# Patient Record
Sex: FEMALE | Employment: FULL TIME | URBAN - METROPOLITAN AREA
[De-identification: names, ages, dates, MRNs, and addresses within clinical notes are randomized per-mention and may not be internally consistent; named-entity substitution may affect disease eponyms.]

---

## 2021-08-27 ENCOUNTER — TELEPHONE (OUTPATIENT)
Dept: SCHEDULING | Facility: IMAGING CENTER | Age: 36
End: 2021-08-27

## 2021-08-30 ENCOUNTER — OFFICE VISIT (OUTPATIENT)
Dept: URGENT CARE | Facility: PHYSICIAN GROUP | Age: 36
End: 2021-08-30
Payer: COMMERCIAL

## 2021-08-30 VITALS
TEMPERATURE: 98 F | OXYGEN SATURATION: 100 % | DIASTOLIC BLOOD PRESSURE: 84 MMHG | HEART RATE: 105 BPM | SYSTOLIC BLOOD PRESSURE: 134 MMHG | RESPIRATION RATE: 14 BRPM | HEIGHT: 60 IN | BODY MASS INDEX: 41.23 KG/M2 | WEIGHT: 210 LBS

## 2021-08-30 DIAGNOSIS — U07.1 COVID-19 VIRUS INFECTION: ICD-10-CM

## 2021-08-30 PROCEDURE — 99202 OFFICE O/P NEW SF 15 MIN: CPT | Mod: CS | Performed by: PHYSICIAN ASSISTANT

## 2021-08-30 RX ORDER — HYDROXYZINE HYDROCHLORIDE 10 MG/1
10 TABLET, FILM COATED ORAL 2 TIMES DAILY PRN
COMMUNITY
Start: 2021-07-01 | End: 2022-12-03

## 2021-08-30 RX ORDER — BUPROPION HYDROCHLORIDE 100 MG/1
100 TABLET, EXTENDED RELEASE ORAL
COMMUNITY
Start: 2021-07-28 | End: 2022-12-03

## 2021-08-30 RX ORDER — GABAPENTIN 100 MG/1
100 CAPSULE ORAL 3 TIMES DAILY
COMMUNITY
Start: 2021-07-28

## 2021-08-30 NOTE — LETTER
August 30, 2021         Patient: Sabi Martinez   YOB: 1985   Date of Visit: 8/30/2021           To Whom it May Concern:    Sabi Martinez was seen in my clinic on 8/30/2021. She may return to work 8/31/21 without restriction.    If you have any questions or concerns, please don't hesitate to call.        Sincerely,           Vanda Young P.A.-C.  Electronically Signed

## 2021-08-30 NOTE — PROGRESS NOTES
Chief Complaint   Patient presents with   • Coronavirus Screening     pt states she just needs clearence to go back to work, tested pos on the 15th for covid       HISTORY OF PRESENT ILLNESS: Patient is a 36 y.o. female who presents today for the following:    COVID +  Symptoms started 8/15  Did a home test showing positive  Has not had any fever, body aches, chills in over a week  Overall feeling better  Needs a note to return to work    There are no problems to display for this patient.      Allergies:Patient has no known allergies.    Current Outpatient Medications Ordered in Epic   Medication Sig Dispense Refill   • hydrOXYzine HCl (ATARAX) 10 MG Tab Take 10 mg by mouth 2 times a day as needed.     • gabapentin (NEURONTIN) 100 MG Cap Take 100 mg by mouth 3 times a day.     • buPROPion SR (WELLBUTRIN-SR) 100 MG TABLET SR 12 HR Take 100 mg by mouth.       No current Epic-ordered facility-administered medications on file.       History reviewed. No pertinent past medical history.    Social History     Tobacco Use   • Smoking status: Current Every Day Smoker     Types: Cigarettes   • Smokeless tobacco: Never Used   Substance Use Topics   • Alcohol use: Never   • Drug use: Never       No family status information on file.   History reviewed. No pertinent family history.    Review of Systems:   Constitutional ROS: No unexpected change in weight  Pulmonary ROS: No chronic cough, sputum, or hemoptysis, No dyspnea on exertion, No wheezing  Cardiovascular ROS: No diaphoresis, No edema, No palpitations  Hematologic/Lymphatic ROS: No chills, No night sweats, No weight loss  Skin/Integumentary ROS: No edema, No evidence of rash, No itching    Exam:  /84   Pulse (!) 105   Temp 36.7 °C (98 °F)   Resp 14   Ht 1.524 m (5')   Wt 95.3 kg (210 lb)   SpO2 100%   General: Well developed, well nourished. No distress.    HEENT: Head is grossly normal.  Pulmonary: Unlabored respiratory effort. Lungs clear to auscultation, no  wheezes, no rhonchi.    Cardiovascular: Regular rate and rhythm without murmur.   Neurologic: Grossly nonfocal. No facial asymmetry noted.  Skin: Warm, dry, good turgor. No rashes in visible areas.   Psych: Normal mood. Alert and oriented to person, place and time.    Assessment/Plan:  RTC as needed.  1. COVID-19 virus infection      Feeling better. Finished quarantine.

## 2021-09-09 ENCOUNTER — OFFICE VISIT (OUTPATIENT)
Dept: MEDICAL GROUP | Facility: PHYSICIAN GROUP | Age: 36
End: 2021-09-09
Payer: COMMERCIAL

## 2021-09-09 VITALS
HEIGHT: 61 IN | BODY MASS INDEX: 39.16 KG/M2 | SYSTOLIC BLOOD PRESSURE: 110 MMHG | HEART RATE: 103 BPM | DIASTOLIC BLOOD PRESSURE: 74 MMHG | TEMPERATURE: 97.8 F | WEIGHT: 207.4 LBS | OXYGEN SATURATION: 95 % | RESPIRATION RATE: 16 BRPM

## 2021-09-09 DIAGNOSIS — E66.09 CLASS 2 OBESITY DUE TO EXCESS CALORIES WITHOUT SERIOUS COMORBIDITY WITH BODY MASS INDEX (BMI) OF 39.0 TO 39.9 IN ADULT: ICD-10-CM

## 2021-09-09 DIAGNOSIS — F32.1 CURRENT MODERATE EPISODE OF MAJOR DEPRESSIVE DISORDER WITHOUT PRIOR EPISODE (HCC): ICD-10-CM

## 2021-09-09 DIAGNOSIS — U09.9 POST-COVID SYNDROME: ICD-10-CM

## 2021-09-09 DIAGNOSIS — Z76.89 ENCOUNTER TO ESTABLISH CARE: ICD-10-CM

## 2021-09-09 PROCEDURE — 99214 OFFICE O/P EST MOD 30 MIN: CPT | Performed by: NURSE PRACTITIONER

## 2021-09-09 RX ORDER — MULTIVIT WITH MINERALS/LUTEIN
TABLET ORAL
COMMUNITY
End: 2022-12-03

## 2021-09-09 ASSESSMENT — PATIENT HEALTH QUESTIONNAIRE - PHQ9
CLINICAL INTERPRETATION OF PHQ2 SCORE: 1
5. POOR APPETITE OR OVEREATING: 2 - MORE THAN HALF THE DAYS
SUM OF ALL RESPONSES TO PHQ QUESTIONS 1-9: 11
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
## 2021-09-09 NOTE — LETTER
[address] [id number]     September 9, 2021    Patient: Sabi Martinez   YOB: 1985   Date of Visit: 9/9/2021       To Whom It May Concern:    Sabi Martinez was seen and treated in our department on 9/9/2021.  Patient has picture taken on August 20, 2021 for positive Abbott at home Covid test.  Due to CDC guidelines she was in quarantine for 10 days.  She was seen by me on urgent care on August 31 and was given a return to work letter.      Sincerely,         LIZZY Negrete.
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
## 2021-09-10 NOTE — ASSESSMENT & PLAN NOTE
Patient reports that she did an at home test and tested positive for Covid on August 17, 2021 with a positive at home Abbot test on August 20, 2021. She was cleared to return to work on August 31, 2021 by urgent care provider. Today she is requesting letter from a provider stating that she had a positive at home Covid test to presented to work in hopes of getting short-term leave.     Patient does continue to report chronic fatigue, brain fog, and occasional mouth ulcers.    Discussed with patient that because I did not see her on the test was not performed in clinic I was unable to verify that the test she presented in a photograph taken on August 20, 2019 was hers however I would write a letter stating that she did not at home test that she reports was positive and her sample. Per CDC guidelines she was to quarantine at home for 10 days. She was seen in urgent care for return to work letter on August 31, 2021.  Letter was provided to patient at time of discharge.

## 2022-06-08 ENCOUNTER — OFFICE VISIT (OUTPATIENT)
Dept: URGENT CARE | Facility: PHYSICIAN GROUP | Age: 37
End: 2022-06-08
Payer: COMMERCIAL

## 2022-06-08 VITALS
WEIGHT: 221 LBS | HEIGHT: 61 IN | TEMPERATURE: 97.6 F | RESPIRATION RATE: 16 BRPM | BODY MASS INDEX: 41.72 KG/M2 | SYSTOLIC BLOOD PRESSURE: 126 MMHG | HEART RATE: 110 BPM | DIASTOLIC BLOOD PRESSURE: 68 MMHG | OXYGEN SATURATION: 97 %

## 2022-06-08 DIAGNOSIS — U07.1 COVID-19 VIRUS INFECTION: Primary | ICD-10-CM

## 2022-06-08 DIAGNOSIS — R05.9 COUGH: ICD-10-CM

## 2022-06-08 PROCEDURE — 99213 OFFICE O/P EST LOW 20 MIN: CPT | Mod: CS | Performed by: PHYSICIAN ASSISTANT

## 2022-06-08 RX ORDER — DEXTROMETHORPHAN HYDROBROMIDE AND PROMETHAZINE HYDROCHLORIDE 15; 6.25 MG/5ML; MG/5ML
5 SYRUP ORAL EVERY 4 HOURS PRN
Qty: 180 ML | Refills: 0 | Status: SHIPPED | OUTPATIENT
Start: 2022-06-08 | End: 2022-06-18

## 2022-06-08 ASSESSMENT — ENCOUNTER SYMPTOMS
FEVER: 1
MYALGIAS: 1
HEADACHES: 1
SPUTUM PRODUCTION: 1
SHORTNESS OF BREATH: 0
WHEEZING: 0
RHINORRHEA: 1
SINUS PAIN: 0
SORE THROAT: 0
COUGH: 1

## 2022-06-08 NOTE — PROGRESS NOTES
"Subjective     Sabi Martinez is a 37 y.o. female who presents with Coronavirus Screening (Positive at home test, all symptoms began Monday night, body aches, fatigue, congestion, cough, fever. )    Medications:    • buPROPion SR Tb12  • gabapentin Caps  • hydrOXYzine HCl Tabs  • promethazine-dextromethorphan  • Vitamin C Tabs    Allergies: Patient has no known allergies.    Problem List: Sabi Martinez does not have any pertinent problems on file.    Surgical History:  Past Surgical History:   Procedure Laterality Date   • ANKLE ARTHROSCOPY Right    • DENTAL EXTRACTION(S)     • KNEE ARTHROSCOPY Right    • PRIMARY C SECTION         Past Social Hx: Sabi Martinez  reports that she has been smoking cigarettes. She has a 1.69 pack-year smoking history. She has never used smokeless tobacco. She reports that she does not drink alcohol and does not use drugs.     Past Family Hx:  Sabi Martinez family history includes ADD / ADHD in her mother; Alcohol abuse in her father and mother; Anxiety disorder in her mother; Bipolar disorder in her mother; Cancer in her father, maternal aunt, and paternal grandfather; Depression in her mother; Drug abuse in her father and mother; No Known Problems in her maternal grandfather, maternal grandmother, and paternal grandmother.     Problem list, medications, and allergies reviewed by myself today in Epic.          Patient presents with:  Coronavirus Screening: Positive at home test, all symptoms began Monday night, body aches, fatigue, congestion, cough, fever.  Pt states she originally needed an \"official\" covid test but no longer needs one because she is also on bereavement leave until 6/16/2022 which would be outside of her quarantine requirements for work.  But she still needs a note to return to work on the 16th.          URI   This is a new problem. The current episode started in the past 7 days. The problem has been gradually worsening. The maximum temperature recorded prior to " "her arrival was 100.4 - 100.9 F. Associated symptoms include congestion, coughing, headaches and rhinorrhea. Pertinent negatives include no sinus pain, sore throat or wheezing. She has tried increased fluids, NSAIDs and acetaminophen for the symptoms. The treatment provided mild relief.       Review of Systems   Constitutional: Positive for fever and malaise/fatigue.   HENT: Positive for congestion and rhinorrhea. Negative for sinus pain and sore throat.    Respiratory: Positive for cough and sputum production. Negative for shortness of breath and wheezing.    Musculoskeletal: Positive for myalgias.   Neurological: Positive for headaches.   All other systems reviewed and are negative.             Objective     /68   Pulse (!) 110   Temp 36.4 °C (97.6 °F) (Temporal)   Resp 16   Ht 1.549 m (5' 1\")   Wt 100 kg (221 lb)   SpO2 97%   BMI 41.76 kg/m²      Physical Exam  Vitals and nursing note reviewed.   Constitutional:       General: She is not in acute distress.     Appearance: Normal appearance. She is well-developed and normal weight. She is not ill-appearing or toxic-appearing.   HENT:      Head: Normocephalic and atraumatic.      Right Ear: Tympanic membrane normal.      Left Ear: Tympanic membrane normal.      Nose: Mucosal edema, congestion and rhinorrhea present.      Mouth/Throat:      Mouth: Mucous membranes are moist.      Pharynx: Uvula midline. No posterior oropharyngeal erythema.   Eyes:      Extraocular Movements: Extraocular movements intact.      Conjunctiva/sclera: Conjunctivae normal.      Pupils: Pupils are equal, round, and reactive to light.   Cardiovascular:      Rate and Rhythm: Regular rhythm. Tachycardia present.      Pulses: Normal pulses.      Heart sounds: Normal heart sounds.   Pulmonary:      Effort: Pulmonary effort is normal. No respiratory distress.      Breath sounds: Normal breath sounds. No stridor. No wheezing, rhonchi or rales.   Chest:      Chest wall: No tenderness. "   Abdominal:      Palpations: Abdomen is soft.   Musculoskeletal:         General: Normal range of motion.      Cervical back: Normal range of motion and neck supple.   Skin:     General: Skin is warm and dry.      Capillary Refill: Capillary refill takes less than 2 seconds.   Neurological:      General: No focal deficit present.      Mental Status: She is alert and oriented to person, place, and time.      Gait: Gait normal.   Psychiatric:         Mood and Affect: Mood normal.         Behavior: Behavior is cooperative.                             Assessment & Plan        1. COVID-19 virus infection     - promethazine-dextromethorphan (PROMETHAZINE-DM) 6.25-15 MG/5ML syrup; Take 5 mL by mouth every four hours as needed for Cough for up to 10 days.  Dispense: 180 mL; Refill: 0    2. Cough     - promethazine-dextromethorphan (PROMETHAZINE-DM) 6.25-15 MG/5ML syrup; Take 5 mL by mouth every four hours as needed for Cough for up to 10 days.  Dispense: 180 mL; Refill: 0            Patient was evaluated in clinic today while wearing appropriate personal protective equipment.      PT to begin prescription medications today as discussed.     PT should follow up with PCP in 1-2 days for re-evaluation if symptoms have not improved.      Discussed red flags and reasons to return to UC or ED.      Pt and/or family verbalized understanding of diagnosis and follow up instructions and was offered informational handout on diagnosis.  PT discharged.

## 2022-06-08 NOTE — LETTER
June 8, 2022         Patient: Sabi Martinez   YOB: 1985   Date of Visit: 6/8/2022           To Whom it May Concern:    Sabi Martinez was seen in my clinic on 6/8/2022. She may return to work on 06/16/2022. She will be well out of the covid quarantine requirements by 6/16/2022.  She may return without restrictions.     If you have any questions or concerns, please don't hesitate to call.        Sincerely,           Sabrina Camacho P.A.-C.  Electronically Signed

## 2022-11-28 ENCOUNTER — OFFICE VISIT (OUTPATIENT)
Dept: URGENT CARE | Facility: PHYSICIAN GROUP | Age: 37
End: 2022-11-28
Payer: COMMERCIAL

## 2022-11-28 VITALS
SYSTOLIC BLOOD PRESSURE: 126 MMHG | RESPIRATION RATE: 16 BRPM | HEIGHT: 61 IN | HEART RATE: 89 BPM | OXYGEN SATURATION: 97 % | TEMPERATURE: 97.7 F | DIASTOLIC BLOOD PRESSURE: 58 MMHG | WEIGHT: 226 LBS | BODY MASS INDEX: 42.67 KG/M2

## 2022-11-28 DIAGNOSIS — R68.89 FLU-LIKE SYMPTOMS: ICD-10-CM

## 2022-11-28 LAB
FLUAV+FLUBV AG SPEC QL IA: NORMAL
INT CON NEG: NORMAL
INT CON POS: NORMAL

## 2022-11-28 PROCEDURE — 99213 OFFICE O/P EST LOW 20 MIN: CPT | Performed by: NURSE PRACTITIONER

## 2022-11-28 PROCEDURE — 87804 INFLUENZA ASSAY W/OPTIC: CPT | Performed by: NURSE PRACTITIONER

## 2022-11-28 ASSESSMENT — ENCOUNTER SYMPTOMS
COUGH: 1
CONSTITUTIONAL NEGATIVE: 1
FEVER: 0
GASTROINTESTINAL NEGATIVE: 1
CARDIOVASCULAR NEGATIVE: 1
EYES NEGATIVE: 1
MYALGIAS: 1

## 2022-11-28 NOTE — LETTER
November 28, 2022       Patient: Sabi Martinez   YOB: 1985   Date of Visit: 11/28/2022         To Whom It May Concern:    In my medical opinion, I recommend that Sabi Martinez be excused from work today due to illness.    If you have any questions or concerns, please don't hesitate to call [phone]          Sincerely,          JOSR Devine  Electronically Signed

## 2022-11-28 NOTE — PROGRESS NOTES
"Subjective:   Sabi Martinez is a 37 y.o. female who presents for Cough (AT NIGHT, TIGHTNESS IN CHEST. X 1 WK.   )      Patient states she has had fatigue and cough for about a week.  She states the cough is mostly at night.  She also reports feeling dizzy and just overall \"unwell.\"  She denies fever or chills, but does report body aches.  Denies shortness of breath or palpitations.    Cough  This is a new problem. The current episode started in the past 7 days. The problem has been unchanged. The cough is Non-productive. Associated symptoms include myalgias. Pertinent negatives include no fever. Nothing aggravates the symptoms. She has tried rest for the symptoms. The treatment provided no relief.     Review of Systems   Constitutional: Negative.  Negative for fever.   HENT: Negative.     Eyes: Negative.    Respiratory:  Positive for cough.    Cardiovascular: Negative.    Gastrointestinal: Negative.    Musculoskeletal:  Positive for myalgias.   Skin: Negative.      Medications, Allergies, and current problem list reviewed today in Epic.     Objective:     /58   Pulse 89   Temp 36.5 °C (97.7 °F) (Temporal)   Resp 16   Ht 1.549 m (5' 1\")   Wt 103 kg (226 lb)   SpO2 97%     Physical Exam  Vitals reviewed.   Constitutional:       Appearance: Normal appearance.   HENT:      Head: Normocephalic and atraumatic.      Right Ear: Tympanic membrane, ear canal and external ear normal.      Left Ear: Tympanic membrane, ear canal and external ear normal.      Nose: Nose normal.      Mouth/Throat:      Mouth: Mucous membranes are moist.      Pharynx: Oropharynx is clear.   Eyes:      Extraocular Movements: Extraocular movements intact.      Conjunctiva/sclera: Conjunctivae normal.      Pupils: Pupils are equal, round, and reactive to light.   Cardiovascular:      Rate and Rhythm: Normal rate and regular rhythm.   Pulmonary:      Effort: Pulmonary effort is normal.      Breath sounds: Normal breath sounds.   Abdominal: "      General: Abdomen is flat.      Palpations: Abdomen is soft.   Musculoskeletal:         General: Normal range of motion.      Cervical back: Normal range of motion and neck supple.   Skin:     General: Skin is warm and dry.      Capillary Refill: Capillary refill takes less than 2 seconds.   Neurological:      General: No focal deficit present.      Mental Status: She is alert.   Psychiatric:         Mood and Affect: Mood normal.         Behavior: Behavior normal.     POCT Influenza is negative today.    Assessment/Plan:     Diagnosis and associated orders:     1. Flu-like symptoms  POCT Influenza A/B         Comments/MDM:     Advised patient symptoms are viral in etiology, recommend supportive care. Increased fluids and rest.  Recommend over-the-counter cold and flu medications and Tylenol and/or ibuprofen for symptomatic relief and fevers.  Discussed use of nedi-pot, humidifier, and Flonase nasal spray as well.  Discussed good hand hygiene and ways to decrease spread of disease.  Follow-up with PCP return for reevaluation if symptoms persist/worsen.  Patient offered discharge instructions regarding viral syndrome.  The patient demonstrated a good understanding and agreed with the treatment plan.            Differential diagnosis, natural history, supportive care, and indications for immediate follow-up discussed.    Advised the patient to follow-up with the primary care physician for recheck, reevaluation, and consideration of further management.    Please note that this dictation was created using voice recognition software. I have made a reasonable attempt to correct obvious errors, but I expect that there are errors of grammar and possibly content that I did not discover before finalizing the note.    This note was electronically signed by ISH Chung

## 2022-12-03 ENCOUNTER — OFFICE VISIT (OUTPATIENT)
Dept: URGENT CARE | Facility: PHYSICIAN GROUP | Age: 37
End: 2022-12-03
Payer: COMMERCIAL

## 2022-12-03 VITALS
WEIGHT: 224 LBS | RESPIRATION RATE: 14 BRPM | HEIGHT: 61 IN | TEMPERATURE: 97.5 F | SYSTOLIC BLOOD PRESSURE: 130 MMHG | DIASTOLIC BLOOD PRESSURE: 82 MMHG | HEART RATE: 108 BPM | BODY MASS INDEX: 42.29 KG/M2 | OXYGEN SATURATION: 98 %

## 2022-12-03 DIAGNOSIS — J02.0 ACUTE STREPTOCOCCAL PHARYNGITIS: ICD-10-CM

## 2022-12-03 LAB
INT CON NEG: NORMAL
INT CON POS: NORMAL
S PYO AG THROAT QL: NORMAL

## 2022-12-03 PROCEDURE — 99214 OFFICE O/P EST MOD 30 MIN: CPT | Performed by: FAMILY MEDICINE

## 2022-12-03 PROCEDURE — 87880 STREP A ASSAY W/OPTIC: CPT | Performed by: FAMILY MEDICINE

## 2022-12-03 RX ORDER — AMOXICILLIN 875 MG/1
TABLET, COATED ORAL
Qty: 20 TABLET | Refills: 0 | Status: SHIPPED | OUTPATIENT
Start: 2022-12-03 | End: 2022-12-13

## 2022-12-03 NOTE — PROGRESS NOTES
Chief Complaint:    Chief Complaint   Patient presents with    Pharyngitis     X 1day        History of Present Illness:    Saw other provider on 11/28/22, had negative Rapid Flu test, was told to observe, visit reviewed. Sore throat started yesterday.      Past Medical History:    Past Medical History:   Diagnosis Date    Anxiety     Arthritis     Depression     Migraine      Past Surgical History:    Past Surgical History:   Procedure Laterality Date    ANKLE ARTHROSCOPY Right     DENTAL EXTRACTION(S)      KNEE ARTHROSCOPY Right     PRIMARY C SECTION       Social History:    Social History     Socioeconomic History    Marital status: Unknown     Spouse name: Not on file    Number of children: Not on file    Years of education: Not on file    Highest education level: Not on file   Occupational History    Not on file   Tobacco Use    Smoking status: Every Day     Packs/day: 0.13     Years: 13.00     Pack years: 1.69     Types: Cigarettes    Smokeless tobacco: Never   Vaping Use    Vaping Use: Never used   Substance and Sexual Activity    Alcohol use: Never    Drug use: Never    Sexual activity: Not Currently     Partners: Male   Other Topics Concern    Not on file   Social History Narrative    Not on file     Social Determinants of Health     Financial Resource Strain: Not on file   Food Insecurity: Not on file   Transportation Needs: Not on file   Physical Activity: Not on file   Stress: Not on file   Social Connections: Not on file   Intimate Partner Violence: Not on file   Housing Stability: Not on file     Family History:    Family History   Problem Relation Age of Onset    Drug abuse Mother     ADD / ADHD Mother     Bipolar disorder Mother     Anxiety disorder Mother     Depression Mother     Alcohol abuse Mother     Cancer Father     Drug abuse Father     Alcohol abuse Father     Cancer Maternal Aunt         stomach     No Known Problems Maternal Grandmother     No Known Problems Maternal Grandfather     No  "Known Problems Paternal Grandmother     Cancer Paternal Grandfather         colon     Medications:    Current Outpatient Medications on File Prior to Visit   Medication Sig Dispense Refill    gabapentin (NEURONTIN) 100 MG Cap Take 100 mg by mouth 3 times a day.       No current facility-administered medications on file prior to visit.     Allergies:    No Known Allergies      Vitals:    Vitals:    22 0942   BP: 130/82   Pulse: (!) 108   Resp: 14   Temp: 36.4 °C (97.5 °F)   TempSrc: Temporal   SpO2: 98%   Weight: 102 kg (224 lb)   Height: 1.549 m (5' 1\")       Physical Exam:    Constitutional: Vital signs reviewed. Appears well-developed and well-nourished. No acute distress.   Eyes: Sclera white, conjunctivae clear.   ENT: External ears normal. Hearing normal. Lips/teeth are normal. Oral mucosa pink and moist. Posterior pharynx and tonsils are moderately erythematous.  Neck: Neck supple.   Pulmonary/Chest: Respirations non-labored.   Musculoskeletal: Normal gait. No muscular atrophy or weakness.  Neurological: Alert and oriented to person, place, and time. Muscle tone normal. Coordination normal.   Skin: No rashes or lesions. Warm, dry, normal turgor.  Psychiatric: Normal mood and affect. Behavior is normal. Judgment and thought content normal.       Diagnostics:    Rapid Strep test is positive.      Medical Decision Makin. Acute streptococcal pharyngitis  - POCT Rapid Strep A  - amoxicillin (AMOXIL) 875 MG tablet; 1 TAB BY MOUTH TWICE A DAY X 10 DAYS.  Dispense: 20 Tablet; Refill: 0       Work note given - excuse for 22.    Discussed with her DDX, management options, and risks, benefits, and alternatives to treatment plan agreed upon.    Posterior pharynx and tonsils are moderately erythematous.    Rapid Strep test is positive.    Complicated condition due to worsening symptoms since last visit with other provider on 22.    May use over-the-counter Tylenol, Ibuprofen, and/or throat lozenges " as needed for fever and/or sore throat.     Agreeable to medication prescribed.    Discussed expected course of duration, time for improvement, and to seek follow-up in Emergency Room, urgent care, or with PCP if getting worse at any time or not improving within expected time frame.

## 2022-12-03 NOTE — LETTER
December 3, 2022         Patient: Sabi Martinez   YOB: 1985   Date of Visit: 12/3/2022           To Whom it May Concern:    Sabi Martinez was seen in my clinic on 12/3/2022.    Please excuse from work for 12/4/22 due to medical condition.    If you have any questions or concerns, please don't hesitate to call.        Sincerely,           Osman Franco M.D.  Electronically Signed

## 2022-12-12 SDOH — ECONOMIC STABILITY: FOOD INSECURITY: WITHIN THE PAST 12 MONTHS, THE FOOD YOU BOUGHT JUST DIDN'T LAST AND YOU DIDN'T HAVE MONEY TO GET MORE.: NEVER TRUE

## 2022-12-12 SDOH — ECONOMIC STABILITY: FOOD INSECURITY: WITHIN THE PAST 12 MONTHS, YOU WORRIED THAT YOUR FOOD WOULD RUN OUT BEFORE YOU GOT MONEY TO BUY MORE.: NEVER TRUE

## 2022-12-12 SDOH — ECONOMIC STABILITY: HOUSING INSECURITY: IN THE LAST 12 MONTHS, HOW MANY PLACES HAVE YOU LIVED?: 1

## 2022-12-12 SDOH — ECONOMIC STABILITY: INCOME INSECURITY: IN THE LAST 12 MONTHS, WAS THERE A TIME WHEN YOU WERE NOT ABLE TO PAY THE MORTGAGE OR RENT ON TIME?: YES

## 2022-12-12 SDOH — ECONOMIC STABILITY: INCOME INSECURITY: HOW HARD IS IT FOR YOU TO PAY FOR THE VERY BASICS LIKE FOOD, HOUSING, MEDICAL CARE, AND HEATING?: PATIENT DECLINED

## 2022-12-12 SDOH — ECONOMIC STABILITY: HOUSING INSECURITY
IN THE LAST 12 MONTHS, WAS THERE A TIME WHEN YOU DID NOT HAVE A STEADY PLACE TO SLEEP OR SLEPT IN A SHELTER (INCLUDING NOW)?: NO

## 2022-12-12 SDOH — ECONOMIC STABILITY: TRANSPORTATION INSECURITY
IN THE PAST 12 MONTHS, HAS LACK OF TRANSPORTATION KEPT YOU FROM MEETINGS, WORK, OR FROM GETTING THINGS NEEDED FOR DAILY LIVING?: NO

## 2022-12-12 SDOH — HEALTH STABILITY: PHYSICAL HEALTH: ON AVERAGE, HOW MANY MINUTES DO YOU ENGAGE IN EXERCISE AT THIS LEVEL?: 150+ MIN

## 2022-12-12 SDOH — ECONOMIC STABILITY: TRANSPORTATION INSECURITY
IN THE PAST 12 MONTHS, HAS THE LACK OF TRANSPORTATION KEPT YOU FROM MEDICAL APPOINTMENTS OR FROM GETTING MEDICATIONS?: NO

## 2022-12-12 SDOH — ECONOMIC STABILITY: HOUSING INSECURITY
IN THE LAST 12 MONTHS, WAS THERE A TIME WHEN YOU DID NOT HAVE A STEADY PLACE TO SLEEP OR SLEPT IN A SHELTER (INCLUDING NOW)?: YES

## 2022-12-12 SDOH — HEALTH STABILITY: MENTAL HEALTH
STRESS IS WHEN SOMEONE FEELS TENSE, NERVOUS, ANXIOUS, OR CAN'T SLEEP AT NIGHT BECAUSE THEIR MIND IS TROUBLED. HOW STRESSED ARE YOU?: RATHER MUCH

## 2022-12-12 SDOH — ECONOMIC STABILITY: TRANSPORTATION INSECURITY
IN THE PAST 12 MONTHS, HAS LACK OF RELIABLE TRANSPORTATION KEPT YOU FROM MEDICAL APPOINTMENTS, MEETINGS, WORK OR FROM GETTING THINGS NEEDED FOR DAILY LIVING?: NO

## 2022-12-12 SDOH — HEALTH STABILITY: PHYSICAL HEALTH: ON AVERAGE, HOW MANY DAYS PER WEEK DO YOU ENGAGE IN MODERATE TO STRENUOUS EXERCISE (LIKE A BRISK WALK)?: 4 DAYS

## 2022-12-12 ASSESSMENT — SOCIAL DETERMINANTS OF HEALTH (SDOH)
IN A TYPICAL WEEK, HOW MANY TIMES DO YOU TALK ON THE PHONE WITH FAMILY, FRIENDS, OR NEIGHBORS?: ONCE A WEEK
HOW OFTEN DO YOU GET TOGETHER WITH FRIENDS OR RELATIVES?: NEVER
HOW OFTEN DO YOU GET TOGETHER WITH FRIENDS OR RELATIVES?: NEVER
HOW OFTEN DO YOU HAVE SIX OR MORE DRINKS ON ONE OCCASION: NEVER
DO YOU BELONG TO ANY CLUBS OR ORGANIZATIONS SUCH AS CHURCH GROUPS UNIONS, FRATERNAL OR ATHLETIC GROUPS, OR SCHOOL GROUPS?: NO
IN A TYPICAL WEEK, HOW MANY TIMES DO YOU TALK ON THE PHONE WITH FAMILY, FRIENDS, OR NEIGHBORS?: ONCE A WEEK
ARE YOU MARRIED, WIDOWED, DIVORCED, SEPARATED, NEVER MARRIED, OR LIVING WITH A PARTNER?: NEVER MARRIED
ARE YOU MARRIED, WIDOWED, DIVORCED, SEPARATED, NEVER MARRIED, OR LIVING WITH A PARTNER?: NEVER MARRIED
DO YOU BELONG TO ANY CLUBS OR ORGANIZATIONS SUCH AS CHURCH GROUPS UNIONS, FRATERNAL OR ATHLETIC GROUPS, OR SCHOOL GROUPS?: NO
HOW MANY DRINKS CONTAINING ALCOHOL DO YOU HAVE ON A TYPICAL DAY WHEN YOU ARE DRINKING: PATIENT DOES NOT DRINK
HOW OFTEN DO YOU ATTENT MEETINGS OF THE CLUB OR ORGANIZATION YOU BELONG TO?: NEVER
HOW OFTEN DO YOU HAVE A DRINK CONTAINING ALCOHOL: NEVER
HOW HARD IS IT FOR YOU TO PAY FOR THE VERY BASICS LIKE FOOD, HOUSING, MEDICAL CARE, AND HEATING?: PATIENT DECLINED
WITHIN THE PAST 12 MONTHS, YOU WORRIED THAT YOUR FOOD WOULD RUN OUT BEFORE YOU GOT THE MONEY TO BUY MORE: NEVER TRUE
HOW OFTEN DO YOU ATTEND CHURCH OR RELIGIOUS SERVICES?: NEVER
HOW OFTEN DO YOU ATTENT MEETINGS OF THE CLUB OR ORGANIZATION YOU BELONG TO?: NEVER
HOW OFTEN DO YOU ATTEND CHURCH OR RELIGIOUS SERVICES?: NEVER

## 2022-12-12 ASSESSMENT — LIFESTYLE VARIABLES
AUDIT-C TOTAL SCORE: 0
HOW OFTEN DO YOU HAVE A DRINK CONTAINING ALCOHOL: NEVER
HOW OFTEN DO YOU HAVE SIX OR MORE DRINKS ON ONE OCCASION: NEVER
HOW MANY STANDARD DRINKS CONTAINING ALCOHOL DO YOU HAVE ON A TYPICAL DAY: PATIENT DOES NOT DRINK
SKIP TO QUESTIONS 9-10: 1

## 2022-12-15 ENCOUNTER — HOSPITAL ENCOUNTER (OUTPATIENT)
Facility: MEDICAL CENTER | Age: 37
End: 2022-12-15
Attending: NURSE PRACTITIONER
Payer: COMMERCIAL

## 2022-12-15 ENCOUNTER — OFFICE VISIT (OUTPATIENT)
Dept: MEDICAL GROUP | Facility: PHYSICIAN GROUP | Age: 37
End: 2022-12-15
Payer: COMMERCIAL

## 2022-12-15 VITALS
OXYGEN SATURATION: 96 % | TEMPERATURE: 97.2 F | RESPIRATION RATE: 18 BRPM | HEIGHT: 61 IN | WEIGHT: 223 LBS | DIASTOLIC BLOOD PRESSURE: 80 MMHG | HEART RATE: 96 BPM | BODY MASS INDEX: 42.1 KG/M2 | SYSTOLIC BLOOD PRESSURE: 132 MMHG

## 2022-12-15 DIAGNOSIS — Z12.4 SCREENING FOR CERVICAL CANCER: ICD-10-CM

## 2022-12-15 DIAGNOSIS — Z11.51 SCREENING FOR HPV (HUMAN PAPILLOMAVIRUS): ICD-10-CM

## 2022-12-15 DIAGNOSIS — F32.1 CURRENT MODERATE EPISODE OF MAJOR DEPRESSIVE DISORDER WITHOUT PRIOR EPISODE (HCC): ICD-10-CM

## 2022-12-15 DIAGNOSIS — Z13.220 SCREENING FOR LIPID DISORDERS: ICD-10-CM

## 2022-12-15 DIAGNOSIS — Z01.419 ENCOUNTER FOR GYNECOLOGICAL EXAMINATION: ICD-10-CM

## 2022-12-15 DIAGNOSIS — Z23 NEED FOR VACCINATION: ICD-10-CM

## 2022-12-15 DIAGNOSIS — E66.09 CLASS 2 OBESITY DUE TO EXCESS CALORIES WITHOUT SERIOUS COMORBIDITY WITH BODY MASS INDEX (BMI) OF 39.0 TO 39.9 IN ADULT: ICD-10-CM

## 2022-12-15 DIAGNOSIS — Z86.2 HISTORY OF ANEMIA: ICD-10-CM

## 2022-12-15 DIAGNOSIS — R53.83 OTHER FATIGUE: ICD-10-CM

## 2022-12-15 DIAGNOSIS — Z13.1 SCREENING FOR DIABETES MELLITUS: ICD-10-CM

## 2022-12-15 DIAGNOSIS — A60.00 HERPES SIMPLEX INFECTION OF GENITOURINARY SYSTEM: ICD-10-CM

## 2022-12-15 DIAGNOSIS — Z01.419 WELL WOMAN EXAM: ICD-10-CM

## 2022-12-15 DIAGNOSIS — E55.9 VITAMIN D DEFICIENCY: ICD-10-CM

## 2022-12-15 PROCEDURE — 90471 IMMUNIZATION ADMIN: CPT | Performed by: NURSE PRACTITIONER

## 2022-12-15 PROCEDURE — 90686 IIV4 VACC NO PRSV 0.5 ML IM: CPT | Performed by: NURSE PRACTITIONER

## 2022-12-15 PROCEDURE — 88175 CYTOPATH C/V AUTO FLUID REDO: CPT

## 2022-12-15 PROCEDURE — 99385 PREV VISIT NEW AGE 18-39: CPT | Mod: 25 | Performed by: NURSE PRACTITIONER

## 2022-12-15 PROCEDURE — 87624 HPV HI-RISK TYP POOLED RSLT: CPT

## 2022-12-15 RX ORDER — VALACYCLOVIR HYDROCHLORIDE 500 MG/1
TABLET, FILM COATED ORAL
Qty: 30 TABLET | Refills: 6 | Status: SHIPPED | OUTPATIENT
Start: 2022-12-15 | End: 2023-07-17
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
## 2022-12-15 NOTE — ASSESSMENT & PLAN NOTE
Test pos for HSV  Last year dx  flareups approx monthly but they don't last very long, a couple of days  Hasn't tried valtrex
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
## 2022-12-16 DIAGNOSIS — Z11.51 SCREENING FOR HPV (HUMAN PAPILLOMAVIRUS): ICD-10-CM

## 2022-12-16 DIAGNOSIS — Z12.4 SCREENING FOR CERVICAL CANCER: ICD-10-CM

## 2022-12-16 DIAGNOSIS — Z01.419 ENCOUNTER FOR GYNECOLOGICAL EXAMINATION: ICD-10-CM

## 2022-12-17 LAB
CYTOLOGY REG CYTOL: NORMAL
HPV HR 12 DNA CVX QL NAA+PROBE: NEGATIVE
HPV16 DNA SPEC QL NAA+PROBE: NEGATIVE
HPV18 DNA SPEC QL NAA+PROBE: NEGATIVE
SPECIMEN SOURCE: NORMAL

## 2023-03-27 ENCOUNTER — TELEPHONE (OUTPATIENT)
Dept: URGENT CARE | Facility: PHYSICIAN GROUP | Age: 38
End: 2023-03-27
Payer: COMMERCIAL

## 2023-05-01 ENCOUNTER — OFFICE VISIT (OUTPATIENT)
Dept: URGENT CARE | Facility: PHYSICIAN GROUP | Age: 38
End: 2023-05-01
Payer: COMMERCIAL

## 2023-05-01 VITALS
OXYGEN SATURATION: 98 % | TEMPERATURE: 98.3 F | RESPIRATION RATE: 16 BRPM | SYSTOLIC BLOOD PRESSURE: 118 MMHG | BODY MASS INDEX: 41.16 KG/M2 | HEART RATE: 96 BPM | HEIGHT: 61 IN | DIASTOLIC BLOOD PRESSURE: 74 MMHG | WEIGHT: 218 LBS

## 2023-05-01 DIAGNOSIS — R11.2 NAUSEA AND VOMITING, UNSPECIFIED VOMITING TYPE: ICD-10-CM

## 2023-05-01 DIAGNOSIS — R19.7 DIARRHEA, UNSPECIFIED TYPE: ICD-10-CM

## 2023-05-01 PROCEDURE — 99214 OFFICE O/P EST MOD 30 MIN: CPT | Performed by: PHYSICIAN ASSISTANT

## 2023-05-01 RX ORDER — ONDANSETRON 4 MG/1
4 TABLET, FILM COATED ORAL EVERY 4 HOURS PRN
Qty: 10 TABLET | Refills: 0 | Status: SHIPPED | OUTPATIENT
Start: 2023-05-01 | End: 2023-11-09

## 2023-05-01 ASSESSMENT — ENCOUNTER SYMPTOMS
HEADACHES: 1
NAUSEA: 1
VOMITING: 1
MYALGIAS: 1
DIARRHEA: 1
BLOOD IN STOOL: 0
CHILLS: 0
FEVER: 0

## 2023-05-01 NOTE — LETTER
LISA  [facility] [address] [address]     May 1, 2023    Patient: Sabi Martinez   YOB: 1985   Date of Visit: 5/1/2023       To Whom It May Concern:    Sabi Martinez was seen and treated in our department on 5/1/2023. Please excuse her from work on 4/30/23-5/2/23.    Sincerely,     Allen Tesfaye P.A.-C.

## 2023-05-02 NOTE — PROGRESS NOTES
Subjective:   Sabi Martinez is a 37 y.o. female who presents for Nausea, Emesis (Started yesterday ), and Letter for School/Work        Patient presents with concerns of sudden onset of nausea and vomiting that began yesterday.  She endorses some mild lower abdominal cramping and diarrhea as well.  She is having trouble keeping down fluid but has been able to keep down some plain toast.  She denies fevers.  She endorses headaches and body aches as well.  States that her sons have been ill with similar symptoms but have been improving.  She took Tylenol earlier today with moderate symptomatic relief.  Past surgical history significant for .  Denies possible pregnancy.      Review of Systems   Constitutional:  Positive for malaise/fatigue. Negative for chills and fever.   Gastrointestinal:  Positive for diarrhea, nausea and vomiting. Negative for blood in stool and melena.   Musculoskeletal:  Positive for myalgias.   Neurological:  Positive for headaches.     PMH:  has a past medical history of Anxiety, Arthritis, Depression, and Migraine.    She has no past medical history of Anemia, Arrhythmia, Asthma, Blood transfusion without reported diagnosis, CHF (congestive heart failure) ([facility]), Clotting disorder ([facility]), COPD (chronic obstructive pulmonary disease) ([facility]), Diabetes ([facility]), GERD (gastroesophageal reflux disease), Glaucoma, Heart attack ([facility]), Heart murmur, HIV (human immunodeficiency virus infection) ([facility]), Hyperlipidemia, Hypertension, Kidney disease, Pulmonary emphysema ([facility]), Seizure ([facility]), Stroke ([facility]), Substance abuse ([facility]), or Thyroid disease.  MEDS:   Current Outpatient Medications:     ondansetron (ZOFRAN) 4 MG Tab tablet, Take 1 Tablet by mouth every four hours as needed for Nausea/Vomiting., Disp: 10 Tablet, Rfl: 0    gabapentin (NEURONTIN) 100 MG Cap, Take 100 mg by mouth 3 times a day., Disp: , Rfl:     valACYclovir (VALTREX) 500 MG Tab, 1 tab PO q12 x 3 days at first sign of rash (Patient  "not taking: Reported on 5/1/2023), Disp: 30 Tablet, Rfl: 6  ALLERGIES: No Known Allergies  SURGHX:   Past Surgical History:   Procedure Laterality Date    ANKLE ARTHROSCOPY Right     DENTAL EXTRACTION(S)      KNEE ARTHROSCOPY Right     PRIMARY C SECTION       SOCHX:  reports that she has been smoking cigarettes. She has a 1.69 pack-year smoking history. She has never used smokeless tobacco. She reports that she does not drink alcohol and does not use drugs.  FH: Family history was reviewed, no pertinent findings to report   Objective:   /74   Pulse 96   Temp 36.8 °C (98.3 °F) (Temporal)   Resp 16   Ht 1.549 m (5' 1\")   Wt 98.9 kg (218 lb)   SpO2 98%   BMI 41.19 kg/m²   Physical Exam  Vitals reviewed.   Constitutional:       General: She is not in acute distress.     Appearance: Normal appearance. She is well-developed. She is not toxic-appearing.   HENT:      Head: Normocephalic and atraumatic.      Right Ear: External ear normal.      Left Ear: External ear normal.      Nose: Nose normal.   Cardiovascular:      Rate and Rhythm: Normal rate and regular rhythm.   Pulmonary:      Effort: Pulmonary effort is normal. No respiratory distress.      Breath sounds: No stridor.   Abdominal:      Comments: Normoactive bowel sounds.  Abdomen is soft and nontender to palpation.  No guarding or rebound tenderness.  No tenderness of McBurney's point.  Negative Segura's.  No palpable masses organomegaly.   Skin:     General: Skin is dry.   Neurological:      Comments: Alert and oriented.    Psychiatric:         Speech: Speech normal.         Behavior: Behavior normal.         Assessment/Plan:   1. Nausea and vomiting, unspecified vomiting type  - ondansetron (ZOFRAN) 4 MG Tab tablet; Take 1 Tablet by mouth every four hours as needed for Nausea/Vomiting.  Dispense: 10 Tablet; Refill: 0    2. Diarrhea, unspecified type    Abdomen is soft and nontender to palpation.  Vital signs stable.  History and symptoms most " consistent with viral gastroenteritis.  Clinical suspicion for emergent intra-abdominal pathology low at this time.  May take Zofran as needed for nausea.  Recommend 50-50 water Gatorade mix.  Imodium as needed for diarrhea.  Brat diet.  If symptoms fail to improve within 48 hours, new symptoms develop at any point or symptoms worsen return to clinic or see PCP for reevaluation.

## 2023-07-17 ENCOUNTER — OCCUPATIONAL MEDICINE (OUTPATIENT)
Dept: URGENT CARE | Facility: PHYSICIAN GROUP | Age: 38
End: 2023-07-17
Payer: COMMERCIAL

## 2023-07-17 VITALS
DIASTOLIC BLOOD PRESSURE: 88 MMHG | HEART RATE: 87 BPM | RESPIRATION RATE: 15 BRPM | HEIGHT: 61 IN | SYSTOLIC BLOOD PRESSURE: 136 MMHG | BODY MASS INDEX: 42.86 KG/M2 | WEIGHT: 227 LBS | TEMPERATURE: 97.9 F | OXYGEN SATURATION: 95 %

## 2023-07-17 DIAGNOSIS — R11.0 NAUSEA: ICD-10-CM

## 2023-07-17 DIAGNOSIS — R06.02 SOB (SHORTNESS OF BREATH): ICD-10-CM

## 2023-07-17 DIAGNOSIS — R51.9 NONINTRACTABLE HEADACHE, UNSPECIFIED CHRONICITY PATTERN, UNSPECIFIED HEADACHE TYPE: ICD-10-CM

## 2023-07-17 DIAGNOSIS — Z77.098 CHEMICAL EXPOSURE: ICD-10-CM

## 2023-07-17 PROCEDURE — 94760 N-INVAS EAR/PLS OXIMETRY 1: CPT | Performed by: NURSE PRACTITIONER

## 2023-07-17 PROCEDURE — 99213 OFFICE O/P EST LOW 20 MIN: CPT | Mod: 25,29 | Performed by: NURSE PRACTITIONER

## 2023-07-17 PROCEDURE — 3075F SYST BP GE 130 - 139MM HG: CPT | Performed by: NURSE PRACTITIONER

## 2023-07-17 PROCEDURE — 3079F DIAST BP 80-89 MM HG: CPT | Performed by: NURSE PRACTITIONER

## 2023-07-17 RX ORDER — ALBUTEROL SULFATE 90 UG/1
2 AEROSOL, METERED RESPIRATORY (INHALATION) EVERY 6 HOURS PRN
Qty: 8.5 G | Refills: 0 | Status: SHIPPED | OUTPATIENT
Start: 2023-07-17

## 2023-07-17 RX ORDER — ONDANSETRON 4 MG/1
4 TABLET, ORALLY DISINTEGRATING ORAL EVERY 8 HOURS PRN
Qty: 10 TABLET | Refills: 0 | Status: SHIPPED | OUTPATIENT
Start: 2023-07-17 | End: 2023-11-09

## 2023-07-17 ASSESSMENT — ENCOUNTER SYMPTOMS
SHORTNESS OF BREATH: 1
FEVER: 0
VOMITING: 0
COUGH: 0
HEADACHES: 1
NAUSEA: 1
CHILLS: 0

## 2023-07-17 NOTE — PROGRESS NOTES
"Subjective:   Sabi Martinez  is a 38 y.o. 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 New W/C nausea and eye irritation, SOB, hard to take a deep breath, burning sensation in nose. )    DOI: 7/16/23: Patient is a 38 old female presents urgent care after she had an exposure to a chemical adhesive yesterday at work.  Patient states that she was working to seal a barrel and when they were finished she took off her PPE and the fumes were still present.  She immediately had burning in her nostrils, in her chest.  Since she has not felt well with nausea, headache, shortness of breath.  Patient does note that symptoms have improved since yesterday after being away from this area.  Patient states that this area has been not well ventilated for the last couple of weeks as it is not working properly.  She was wearing a respirator while handling chemical substance.  Patient with no significant medical history.  Does have a history of tobacco use.   HPI  Review of Systems   Constitutional:  Negative for chills, fever and malaise/fatigue.   HENT:  Positive for congestion.    Respiratory:  Positive for shortness of breath. Negative for cough.    Cardiovascular:  Negative for chest pain.   Gastrointestinal:  Positive for nausea. Negative for vomiting.   Neurological:  Positive for headaches.     No Known Allergies   Objective:   /88   Pulse 87   Temp 36.6 °C (97.9 °F) (Temporal)   Resp 15   Ht 1.549 m (5' 1\")   Wt 103 kg (227 lb)   SpO2 95%   BMI 42.89 kg/m²   Physical Exam  Vitals and nursing note reviewed.   Constitutional:       General: She is not in acute distress.     Appearance: She is well-developed.   HENT:      Head: Normocephalic and atraumatic.      Right Ear: External ear normal.      Left Ear: External ear normal.      Nose: Nose normal.      Mouth/Throat:      Mouth: Mucous membranes are moist.   Eyes:      Conjunctiva/sclera: Conjunctivae normal. "   Cardiovascular:      Rate and Rhythm: Normal rate.   Pulmonary:      Effort: Pulmonary effort is normal. No respiratory distress.      Breath sounds: Normal breath sounds.   Abdominal:      General: There is no distension.   Musculoskeletal:         General: Normal range of motion.   Skin:     General: Skin is warm and dry.   Neurological:      General: No focal deficit present.      Mental Status: She is alert and oriented to person, place, and time. Mental status is at baseline.      Gait: Gait (gait at baseline) normal.   Psychiatric:         Judgment: Judgment normal.       Patient nontoxic-appearing.  Lung sounds clear to auscultation, work of breathing normal, oxygen saturation 95% room air.  Heart rhythm with no arrhythmia, normal, pulses +2 currently.  Neuro A&Ox4 , PERRLA.  Abdomen nontender, skin without rashes   Assessment/Plan:     1. Chemical exposure        2. Nausea  ondansetron (ZOFRAN ODT) 4 MG TABLET DISPERSIBLE      3. SOB (shortness of breath)  albuterol 108 (90 Base) MCG/ACT Aero Soln inhalation aerosol      4. Nonintractable headache, unspecified chronicity pattern, unspecified headache type        Overall physical exam fairly benign, will trial inhaler for intermittent shortness of breath, nausea medication.  Encouraged Tylenol Motrin as needed for headache.  Patient will may return to work with restriction recommend working in ventilated area away from chemical substance.  Patient will follow-up in 4 days for reevaluation.    My total time spent caring for the patient on the day of the encounter was 31 minutes.   This does not include time spent on separately billable procedures/tests.

## 2023-07-17 NOTE — LETTER
[facility] [address] [place] [address]  Phone:  [phone] - Fax:  [phone]   [facility] Progress Report and Disability Certification  Date of Service: 7/17/2023   No Show:  No  Date / Time of Next Visit:     Claim Information   Patient Name: Sabi Martinez  Claim Number:     Employer: [employer]  Date of Injury: 7/16/2023     Insurer / TPA: [employer]  ID / SSN:     Occupation: Production  Diagnosis: Diagnoses of Chemical exposure, Nausea, SOB (shortness of breath), and Nonintractable headache, unspecified chronicity pattern, unspecified headache type were pertinent to this visit.    Medical Information   Related to Industrial Injury? Yes   Subjective Complaints:  DOI: 7/16/23: Patient is a 38 old female presents urgent care after she had an exposure to a chemical adhesive yesterday at work.  Patient states that she was working to seal a barrel and when they were finished she took off her PPE and the fumes were still present.  She immediately had burning in her nostrils, in her chest.  Since she has not felt well with nausea, headache, shortness of breath.  Patient does note that symptoms have improved since yesterday after being away from this area.  Patient states that this area has been not well ventilated for the last couple of weeks as it is not working properly.  She was wearing a respirator while handling chemical substance.  Patient with no significant medical history.  Does have a history of tobacco use.   Objective Findings: Patient nontoxic-appearing.  Lung sounds clear to auscultation, work of breathing normal, oxygen saturation 95% room air.  Heart rhythm with no arrhythmia, normal, pulses +2 currently.  Neuro A&Ox4 , PERRLA.  Abdomen nontender, skin without rashes   Pre-Existing Condition(s):     Assessment:        Status: Additional Care Required  Permanent Disability:No    Plan:      Diagnostics:      Comments:  Recommend  patient work in a different area well ventilated x4 days    Disability Information   Status: Released to Restricted Duty    From:     Through:   Restrictions are: Temporary  Comments:Recommend patient work in a different area well ventilated x4 days   Physical Restrictions   Sitting:    Standing:    Stooping:    Bending:      Squatting:    Walking:    Climbing:    Pushing:      Pulling:    Other:    Reaching Above Shoulder (L):   Reaching Above Shoulder (R):       Reaching Below Shoulder (L):    Reaching Below Shoulder (R):      Not to exceed Weight Limits   Carrying(hrs):   Weight Limit(lb):   Lifting(hrs):   Weight  Limit(lb):     Comments: Overall physical exam fairly benign, will trial inhaler for intermittent shortness of breath, nausea medication.  Encouraged Tylenol Motrin as needed for headache.  Patient will may return to work with restriction recommend working in ventilated area away from chemical substance.  Patient will follow-up in 4 days for reevaluation.    Repetitive Actions   Hands: i.e. Fine Manipulations from Grasping:     Feet: i.e. Operating Foot Controls:     Driving / Operate Machinery:     Health Care Provider’s Original or Electronic Signature  [facility] Provider’s Original or Electronic Signature    Miah Mcguire DO MPH     Clinic Name / Location: [address] [address] Clinic Phone Number: Dept: [phone]   Appointment Time: 10:30 Am Visit Start Time: 11:15 AM   Check-In Time:  10:58 Am Visit Discharge Time: 11:50 AM   Original-Treating Physician or Chiropractor    Page 2-Insurer/TPA    Page 3-Employer    Page 4-Employee

## 2023-07-17 NOTE — LETTER
"EMPLOYEE’S CLAIM FOR COMPENSATION/ REPORT OF INITIAL TREATMENT  FORM C-4  PLEASE TYPE OR PRINT    EMPLOYEE’S CLAIM - PROVIDE ALL INFORMATION REQUESTED   First Name  Sabi Last Name  Juan Birthdate                    1985                Sex  female Claim Number (Insurer’s Use Only)   Home Address  [address] Age  38 y.o. Height  1.549 m (5' 1\") Weight  103 kg (227 lb) [facility]     [facility] Zip  [address] Telephone  [phone] (home)    Mailing Address  [address] [facility] Zip  [address] Primary Language Spoken  English    INSURER   THIRD-PARTY     [employer]   Employee's Occupation (Job Title) When Injury or Occupational Disease Occurred  Production    Employer's Name/Company Name  [employer]  Telephone  [phone]    Office Mail Address (Number and Street)  [address]        Date of Injury  7/16/2023               Hours Injury  11:07 AM Date Employer Notified  7/16/2023 Last Day of Work after Injury or Occupational Disease  7/17/2023 Supervisor to Whom Injury     Reported  Rustam Martin   Address or Location of Accident (if applicable)  Work [1]   What were you doing at the time of accident? (if applicable)  Adhesive barrel change    How did this injury or occupational disease occur? (Be specific and answer in detail. Use additional sheet if necessary)  No proper ventilation. Inhales fumes   If you believe that you have an occupational disease, when did you first have knowledge of the disability and its relationship to your employment?  N/A Witnesses to the Accident (if applicable)  Billy Harpe      Nature of Injury or Occupational Disease  Workers' Compensation  Part(s) of Body Injured or Affected  Nose, Lungs, N/A    I CERTIFY THAT THE ABOVE IS TRUE AND CORRECT TO T HE BEST OF MY KNOWLEDGE AND THAT I HAVE PROVIDED THIS INFORMATION IN ORDER TO OBTAIN THE BENEFITS OF " [place], OR CHAPTER 617 [place]).  I HEREBY AUTHORIZE ANY PHYSICIAN, CHIROPRACTOR, SURGEON, PRACTITIONER OR ANY OTHER PERSON, ANY HOSPITAL, INCLUDING [facility] OR [facility], ANY  MEDICAL SERVICE ORGANIZATION, ANY INSURANCE COMPANY, OR OTHER INSTITUTION OR ORGANIZATION TO RELEASE TO EACH OTHER, ANY MEDICAL OR OTHER INFORMATION, INCLUDING BENEFITS PAID OR PAYABLE, PERTINENT TO THIS INJURY OR DISEASE, EXCEPT INFORMATION RELATIVE TO DIAGNOSIS, TREATMENT AND/OR COUNSELING FOR AIDS, PSYCHOLOGICAL CONDITIONS, ALCOHOL OR CONTROLLED SUBSTANCES, FOR WHICH I MUST GIVE SPECIFIC AUTHORIZATION.  A PHOTOSTAT OF THIS AUTHORIZATION SHALL BE VALID AS THE ORIGINAL.       Date 07/17/2023   Place [facility] Employee’s Original or  *Electronic Signature   THIS REPORT MUST BE COMPLETED AND MAILED WITHIN 3 WORKING DAYS OF TREATMENT   Place  [facility]  Name of Facility  [place]   Date  7/17/2023 Diagnosis and Description of Injury or Occupational Disease  (Z77.098) Chemical exposure  (R11.0) Nausea  (R06.02) SOB (shortness of breath)  (R51.9) Nonintractable headache, unspecified chronicity pattern, unspecified headache type Is there evidence that the injured employee was under the influence of alcohol and/or another controlled substance at the time of accident?  ? No ? Yes (if yes, please explain)   Hour  11:15 AM   Diagnoses of Chemical exposure, Nausea, SOB (shortness of breath), and Nonintractable headache, unspecified chronicity pattern, unspecified headache type were pertinent to this visit. No   Treatment  Overall physical exam fairly benign, will trial inhaler for intermittent shortness of breath, nausea medication.  Encouraged Tylenol Motrin as needed for headache.  Patient will may return to work with restriction recommend working in ventilated area away from chemical substance.  Patient will follow-up  in 4 days for reevaluation.  Have you advised the patient to remain off work five days or     more?    X-Ray Findings      ? Yes Indicate dates:   From   To      From information given by the employee, together with medical evidence, can        you directly connect this injury or occupational disease as job incurred?  Yes ? No If no, is the injured employee capable of:  ? full duty  No ? modified duty  Yes   Is additional medical care by a physician indicated?  Yes If modified duty, specify any limitations / restrictions  Recommended in different area away from chemical substance.   Do you know of any previous injury or disease contributing to this condition or occupational disease?  ? Yes ? No (Explain if yes)                          No   Date  7/17/2023 Print Health Care Provider's  Name  JOSR Aldridge I certify that the employer’s copy of  this form was delivered to the employer on:   Address  [address] Insurer’s Use Only     [facility] Zip  [address]    Provider’s Tax ID Number  [id number] Telephone  Dept: [phone]             Health Care Provider’s Original or Electronic Signature  e-LUIS FELIPE Flores.R.OLIVER Degree (MD,DO, DC,PA-C,APRN)  APRN      * Complete and attach Release of Information (Form C-4A) when injured employee signs C-4 Form electronically  ORIGINAL - TREATING HEALTHCARE PROVIDER PAGE 2 - INSURER/TPA PAGE 3 - EMPLOYER PAGE 4 - EMPLOYEE             Form C-4 (rev.08/21)           BRIEF DESCRIPTION OF RIGHTS AND BENEFITS  (Pursuant to [place] 616C.050)    Notice of Injury or Occupational Disease (Incident Report Form C-1): If an injury or occupational disease (OD) arises out of and in the course of employment, you must provide written notice to your employer as soon as practicable, but no later than 7 days after the accident or OD. Your employer shall maintain a sufficient supply of the required forms.    Claim for Compensation (Form C-4): If medical  "treatment is sought, the form C-4 is available at the place of initial treatment. A completed \"Claim for Compensation\" (Form C-4) must be filed within 90 days after an accident or OD. The treating physician or chiropractor must, within 3 working days after treatment, complete and mail to the employer, the employer's insurer and third-party , the Claim for Compensation.    Medical Treatment: If you require medical treatment for your on-the-job injury or OD, you may be required to select a physician or chiropractor from a list provided by your workers’ compensation insurer, if it has contracted with an Organization for Managed Care (MCO) or Preferred Provider Organization (PPO) or providers of health care. If your employer has not entered into a contract with an MCO or PPO, you may select a physician or chiropractor from the Panel of Physicians and Chiropractors. Any medical costs related to your industrial injury or OD will be paid by your insurer.    Temporary Total Disability (TTD): If your doctor has certified that you are unable to work for a period of at least 5 consecutive days, or 5 cumulative days in a 20-day period, or places restrictions on you that your employer does not accommodate, you may be entitled to TTD compensation.    Temporary Partial Disability (TPD): If the wage you receive upon reemployment is less than the compensation for TTD to which you are entitled, the insurer may be required to pay you TPD compensation to make up the difference. TPD can only be paid for a maximum of 24 months.    Permanent Partial Disability (PPD): When your medical condition is stable and there is an indication of a PPD as a result of your injury or OD, within 30 days, your insurer must arrange for an evaluation by a rating physician or chiropractor to determine the degree of your PPD. The amount of your PPD award depends on the date of injury, the results of the PPD evaluation, your age and " wage.    Permanent Total Disability (PTD): If you are medically certified by a treating physician or chiropractor as permanently and totally disabled and have been granted a PTD status by your insurer, you are entitled to receive monthly benefits not to exceed 66 2/3% of your average monthly wage. The amount of your PTD payments is subject to reduction if you previously received a lump-sum PPD award.    Vocational Rehabilitation Services: You may be eligible for vocational rehabilitation services if you are unable to return to the job due to a permanent physical impairment or permanent restrictions as a result of your injury or occupational disease.    Transportation and Per Guerda Reimbursement: You may be eligible for travel expenses and per guerda associated with medical treatment.    Reopening: You may be able to reopen your claim if your condition worsens after claim closure.     Appeal Process: If you disagree with a written determination issued by the insurer or the insurer does not respond to your request, you may appeal to the Department of Administration, , by following the instructions contained in your determination letter. You must appeal the determination within 70 days from the date of the determination letter at [address], [address], [place], [place] [address], or [address], [place] [address]. If you disagree with the  decision, you may appeal to the Department of Administration, . You must file your appeal within 30 days from the date of the  decision letter at [address], [address], [place] [address], or [address] [facility] [facility] [address], [place] [address]. If you disagree with a decision of an , you may file a petition for judicial review with the District Court. You must do so within 30 days of the Appeal Officer’s decision. You may be represented by an   at your own expense or you may contact the NA for possible representation.    [place]  for Injured Workers (NAIW): If you disagree with a  decision, you may request that NAIW represent you without charge at an  Hearing. For information regarding denial of benefits, you may contact the NA at: [address], [address], [place] [address], [phone], or [address], [place] [address], [phone]    To File a Complaint with the Division: If you wish to file a complaint with the  of the Division of Industrial Relations (DIR),  please contact the Workers’ Compensation Section, [address], [address], [place], [place] [address], telephone (643) [phone], or [address], [address], [place], [place] [address], telephone (000) [phone].    For assistance with Workers’ Compensation Issues: You may contact the [facility] Office for Consumer Health Assistance, [address], [facility] [address], [place], [place] [address], Toll Free 1-580.483.8842, Web site: http://[facility].[place].gov/Programs/JOSE MARTIN E-mail: [email or web address]              __________________________________________________________________                                    ____07/17/2023____            Employee Name / Signature                                                                                                                            Date                                                                                                                                                                                                                              D-2 (rev. 10/20)

## 2023-07-21 ENCOUNTER — OCCUPATIONAL MEDICINE (OUTPATIENT)
Dept: URGENT CARE | Facility: PHYSICIAN GROUP | Age: 38
End: 2023-07-21
Payer: COMMERCIAL

## 2023-07-21 VITALS
HEIGHT: 61 IN | HEART RATE: 100 BPM | SYSTOLIC BLOOD PRESSURE: 120 MMHG | WEIGHT: 222 LBS | RESPIRATION RATE: 20 BRPM | BODY MASS INDEX: 41.91 KG/M2 | DIASTOLIC BLOOD PRESSURE: 88 MMHG | TEMPERATURE: 97.2 F | OXYGEN SATURATION: 98 %

## 2023-07-21 DIAGNOSIS — R06.02 SOB (SHORTNESS OF BREATH): ICD-10-CM

## 2023-07-21 DIAGNOSIS — R51.9 NONINTRACTABLE HEADACHE, UNSPECIFIED CHRONICITY PATTERN, UNSPECIFIED HEADACHE TYPE: ICD-10-CM

## 2023-07-21 DIAGNOSIS — Z77.098 CHEMICAL EXPOSURE: ICD-10-CM

## 2023-07-21 PROCEDURE — 99213 OFFICE O/P EST LOW 20 MIN: CPT | Performed by: FAMILY MEDICINE

## 2023-07-21 PROCEDURE — 1126F AMNT PAIN NOTED NONE PRSNT: CPT | Performed by: FAMILY MEDICINE

## 2023-07-21 PROCEDURE — 3074F SYST BP LT 130 MM HG: CPT | Performed by: FAMILY MEDICINE

## 2023-07-21 PROCEDURE — 3079F DIAST BP 80-89 MM HG: CPT | Performed by: FAMILY MEDICINE

## 2023-07-21 ASSESSMENT — PAIN SCALES - GENERAL: PAINLEVEL: NO PAIN

## 2023-07-21 NOTE — LETTER
[address] [place] [address]  Phone:  [phone] - Fax:  [phone]   [facility] Progress Report and Disability Certification  Date of Service: 7/21/2023   No Show:  No  Date / Time of Next Visit: 7/28/2023   Claim Information   Patient Name: Sabi Martinez  Claim Number:     Employer: [employer]  Date of Injury: 7/16/2023     Insurer / TPA: [employer]  ID / SSN:     Occupation: Production  Diagnosis: Diagnoses of Chemical exposure, Nonintractable headache, unspecified chronicity pattern, unspecified headache type, and SOB (shortness of breath) were pertinent to this visit.    Medical Information   Related to Industrial Injury? Yes    Subjective Complaints:  DOI: 7/16/23. Compared to last visit on 7/17/23, she is better. Still having headaches, using Tylenol, which helps headaches. Having shortness of breath, inhaler does help when she uses it. No more nausea or vomiting. 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 Was off work yesterday and felt better. Today continues to feel better.   Objective Findings:     Pre-Existing Condition(s):     Assessment:   Condition Improved    Status: Additional Care Required  Comments:Return on 7/28/23 or sooner if needed.  Permanent Disability:No    Plan: Medication  Comments:Continue Tylenol as needed for headache and inhaler as needed for shortness of breath.    Diagnostics:      Comments:       Disability Information   Status: Released to Restricted Duty    From:  7/21/2023  Through: 7/28/2023 Restrictions are: Temporary   Physical Restrictions   Sitting:    Standing:    Stooping:    Bending:      Squatting:    Walking:    Climbing:    Pushing:      Pulling:    Other:    Reaching Above Shoulder (L):   Reaching Above Shoulder (R):       Reaching Below Shoulder (L):    Reaching Below Shoulder (R):      Not to exceed Weight Limits   Carrying(hrs):   Weight  Limit(lb):   Lifting(hrs):   Weight  Limit(lb):     Comments: May work in well ventilated area. Avoid poorly ventilated areas.    Repetitive Actions   Hands: i.e. Fine Manipulations from Grasping:     Feet: i.e. Operating Foot Controls:     Driving / Operate Machinery:     Health Care Provider’s Original or Electronic Signature  Osman Franco M.D. Health Care Provider’s Original or Electronic Signature    Miah Mcguire DO MPH     Clinic Name / Location: [address] [address] Clinic Phone Number: Dept: [phone]   Appointment Time: 10:00 Am Visit Start Time: 10:34 AM   Check-In Time:  9:37 Am Visit Discharge Time: 11:10 AM   Original-Treating Physician or Chiropractor    Page 2-Insurer/TPA    Page 3-Employer    Page 4-Employee

## 2023-07-21 NOTE — PROGRESS NOTES
Chief Complaint:    Chief Complaint   Patient presents with    Follow-Up     WC follow. Inhaled fumes. Improved breathing with inhaler. Headaches are still reoccurring.        History of Present Illness:    DOI: 7/16/23. Compared to last visit on 7/17/23, she is better. Still having headaches, using Tylenol, which helps headaches. Having shortness of breath, inhaler does help when she uses it. No more nausea or vomiting. 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 Was off work yesterday and felt better. Today continues to feel better.      Past Medical History:    Past Medical History:   Diagnosis Date    Anxiety     Arthritis     Depression     Migraine      Past Surgical History:    Past Surgical History:   Procedure Laterality Date    ANKLE ARTHROSCOPY Right     DENTAL EXTRACTION(S)      KNEE ARTHROSCOPY Right     PRIMARY C SECTION       Social History:    Social History     Socioeconomic History    Marital status: Unknown     Spouse name: Not on file    Number of children: Not on file    Years of education: Not on file    Highest education level: 12th grade   Occupational History    Not on file   Tobacco Use    Smoking status: Every Day     Packs/day: 0.13     Years: 13.00     Pack years: 1.69     Types: Cigarettes    Smokeless tobacco: Never   Vaping Use    Vaping Use: Never used   Substance and Sexual Activity    Alcohol use: Never    Drug use: Never    Sexual activity: Not Currently     Partners: Male   Other Topics Concern    Not on file   Social History Narrative    Not on file     Social Determinants of Health     Financial Resource Strain: Unknown (12/12/2022)    Overall Financial Resource Strain (CARDIA)     Difficulty of Paying Living Expenses: Patient refused   Food Insecurity: No Food Insecurity (12/12/2022)    Hunger Vital Sign     Worried About Running Out of Food in the Last Year: Never true     Ran Out of Food in the Last Year: Never true   Transportation  Needs: No Transportation Needs (12/12/2022)    PRAPARE - Transportation     Lack of Transportation (Medical): No     Lack of Transportation (Non-Medical): No   Physical Activity: Sufficiently Active (12/12/2022)    Exercise Vital Sign     Days of Exercise per Week: 4 days     Minutes of Exercise per Session: 150+ min   Stress: Stress Concern Present (12/12/2022)    [place] [employer] Occupational [employer] - Occupational Stress Questionnaire     Feeling of Stress : Rather much   Social Connections: Socially Isolated (12/12/2022)    Social Connection and Isolation Panel [NHANES]     Frequency of Communication with Friends and Family: Once a week     Frequency of Social Gatherings with Friends and Family: Never     Attends Mosque Services: Never     Active Member of Clubs or Organizations: No     Attends Club or Organization Meetings: Never     Marital Status: Never    Intimate Partner Violence: Not on file   Housing Stability: High Risk (12/12/2022)    Housing Stability Vital Sign     Unable to Pay for Housing in the Last Year: Yes     Number of Places Lived in the Last Year: 1     Unstable Housing in the Last Year: No     Family History:    Family History   Problem Relation Age of Onset    Drug abuse Mother     ADD / ADHD Mother     Bipolar disorder Mother     Anxiety disorder Mother     Depression Mother     Alcohol abuse Mother     Cancer Father     Drug abuse Father     Alcohol abuse Father     Cancer Maternal Aunt         stomach     No Known Problems Maternal Grandmother     No Known Problems Maternal Grandfather     No Known Problems Paternal Grandmother     Cancer Paternal Grandfather         colon     Medications:    Current Outpatient Medications on File Prior to Visit   Medication Sig Dispense Refill    albuterol 108 (90 Base) MCG/ACT Aero Soln inhalation aerosol Inhale 2 Puffs every 6 hours as needed for Shortness of Breath. 8.5 g 0    ondansetron (ZOFRAN ODT) 4 MG TABLET DISPERSIBLE Take 1  "Tablet by mouth every 8 hours as needed for Nausea/Vomiting. 10 Tablet 0    ondansetron (ZOFRAN) 4 MG Tab tablet Take 1 Tablet by mouth every four hours as needed for Nausea/Vomiting. 10 Tablet 0    gabapentin (NEURONTIN) 100 MG Cap Take 100 mg by mouth 3 times a day.       No current facility-administered medications on file prior to visit.     Allergies:    No Known Allergies      Vitals:    Vitals:    23 1036   BP: 120/88   Pulse: 100   Resp: 20   Temp: 36.2 °C (97.2 °F)   TempSrc: Temporal   SpO2: 98%   Weight: 101 kg (222 lb)   Height: 1.549 m (5' 1\")       Physical Exam:    Constitutional: Vital signs reviewed. Appears well-developed and well-nourished. No acute distress.   Eyes: Sclera white, conjunctivae clear.   ENT: External ears normal. Hearing normal.   Neck: Neck supple.   Pulmonary/Chest: Respirations non-labored.   Musculoskeletal: Normal gait. No muscular atrophy or weakness.  Neurological: Alert and oriented to person, place, and time. Muscle tone normal. Coordination normal.   Skin: No rashes or lesions. Warm, dry, normal turgor.  Psychiatric: Normal mood and affect. Behavior is normal. Judgment and thought content normal.       Assessment / Plan & Medical Decision Makin. Chemical exposure    2. Nonintractable headache, unspecified chronicity pattern, unspecified headache type    3. SOB (shortness of breath)       Discussed with her DDX, management options, and risks, benefits, and alternatives to treatment plan agreed upon.    DOI: 23. Compared to last visit on 23, she is better. Still having headaches, using Tylenol, which helps headaches. Having shortness of breath, inhaler does help when she uses it. No more nausea or vomiting. 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 Was off work yesterday and felt better. Today continues to feel better.    Work restrictions: May work in well ventilated area. Avoid poorly ventilated " areas.    Continue Tylenol as needed for headache and inhaler as needed for shortness of breath.    Return on 7/28/23 or sooner if needed.

## 2023-07-28 ENCOUNTER — OCCUPATIONAL MEDICINE (OUTPATIENT)
Dept: URGENT CARE | Facility: PHYSICIAN GROUP | Age: 38
End: 2023-07-28
Payer: COMMERCIAL

## 2023-07-28 VITALS
RESPIRATION RATE: 16 BRPM | HEIGHT: 61 IN | DIASTOLIC BLOOD PRESSURE: 88 MMHG | SYSTOLIC BLOOD PRESSURE: 132 MMHG | HEART RATE: 106 BPM | OXYGEN SATURATION: 99 % | WEIGHT: 220 LBS | BODY MASS INDEX: 41.54 KG/M2 | TEMPERATURE: 97.3 F

## 2023-07-28 DIAGNOSIS — Z77.098 CHEMICAL EXPOSURE: ICD-10-CM

## 2023-07-28 DIAGNOSIS — R51.9 NONINTRACTABLE HEADACHE, UNSPECIFIED CHRONICITY PATTERN, UNSPECIFIED HEADACHE TYPE: ICD-10-CM

## 2023-07-28 PROCEDURE — 1126F AMNT PAIN NOTED NONE PRSNT: CPT | Performed by: FAMILY MEDICINE

## 2023-07-28 PROCEDURE — 3075F SYST BP GE 130 - 139MM HG: CPT | Performed by: FAMILY MEDICINE

## 2023-07-28 PROCEDURE — 99213 OFFICE O/P EST LOW 20 MIN: CPT | Performed by: FAMILY MEDICINE

## 2023-07-28 PROCEDURE — 3079F DIAST BP 80-89 MM HG: CPT | Performed by: FAMILY MEDICINE

## 2023-07-28 ASSESSMENT — ENCOUNTER SYMPTOMS
BLURRED VISION: 0
COUGH: 0
WEIGHT LOSS: 0
FOCAL WEAKNESS: 0
SENSORY CHANGE: 0
HEMOPTYSIS: 0

## 2023-07-28 ASSESSMENT — PAIN SCALES - GENERAL: PAINLEVEL: NO PAIN

## 2023-07-28 NOTE — LETTER
[address] [place] [address]  Phone:  [phone] - Fax:  [phone]   [facility] Progress Report and Disability Certification  Date of Service: 7/28/2023   No Show:  No  Date / Time of Next Visit:  discharge MMI   Claim Information   Patient Name: Sabi Martinez  Claim Number:     Employer: [employer]  Date of Injury: 7/16/2023     Insurer / TPA: [employer]  ID / SSN:     Occupation: Production  Diagnosis: Diagnoses of Chemical exposure and Nonintractable headache, unspecified chronicity pattern, unspecified headache type were pertinent to this visit.    Medical Information   Related to Industrial Injury? Yes    Subjective Complaints:  DOI: 7/16/2023  F/u chemical fume exposure with symptoms of nausea, SOB, and HA.   She has had intermittent HA but doing well today. No current nausea or SOB. She is ready to return to work. No other aggravating or alleviating factors.     Objective Findings: HEENT: atraumatic, normocephalic  PULM: clear to auscultation  SKIN: warm, dry without rash.    Pre-Existing Condition(s):     Assessment:   Condition Improved    Status: Discharged /  MMI  Permanent Disability:No    Plan: Medication    Diagnostics:      Comments:       Disability Information   Status: Released to Full Duty    From:  7/28/2023  Through:   Restrictions are:     Physical Restrictions   Sitting:    Standing:    Stooping:    Bending:      Squatting:    Walking:    Climbing:    Pushing:      Pulling:    Other:    Reaching Above Shoulder (L):   Reaching Above Shoulder (R):       Reaching Below Shoulder (L):    Reaching Below Shoulder (R):      Not to exceed Weight Limits   Carrying(hrs):   Weight Limit(lb):   Lifting(hrs):   Weight  Limit(lb):     Comments:      Repetitive Actions   Hands: i.e. Fine Manipulations from Grasping:     Feet: i.e. Operating Foot Controls:     Driving / Operate Machinery:     Health Care Provider’s  Original or Electronic Signature  Derek Cleveland M.D. Health Care Provider’s Original or Electronic Signature    Miah Mcguire DO MPH     Clinic Name / Location: [address] [place] [address] Clinic Phone Number: Dept: [phone]   Appointment Time: 12:30 Pm Visit Start Time: 12:35 PM   Check-In Time:  12:04 Pm Visit Discharge Time: 12:59 PM    Original-Treating Physician or Chiropractor    Page 2-Insurer/TPA    Page 3-Employer    Page 4-Employee

## 2023-07-28 NOTE — LETTER
[address] [place] [address]  Phone:  [phone] - Fax:  [phone]   [facility] Progress Report and Disability Certification  Date of Service: 7/28/2023   No Show:  No  Date / Time of Next Visit:     Claim Information   Patient Name: Sabi Martinez  Claim Number:     Employer: [employer] *** Date of Injury: 7/16/2023     Insurer / TPA: [employer] *** ID / SSN:     Occupation: Production *** Diagnosis: Diagnoses of Chemical exposure and Nonintractable headache, unspecified chronicity pattern, unspecified headache type were pertinent to this visit.    Medical Information   Related to Industrial Injury? Yes ***   Subjective Complaints:  DOI: 7/16/2023  F/u chemical fume exposure with symptoms of nausea, SOB, and HA.   She has had intermittent HA but doing well today. No current nausea or SOB. She is ready to return to work. No other aggravating or alleviating factors.     Objective Findings: HEENT: atraumatic, normocephalic  PULM: clear to auscultation  SKIN: warm, dry without rash.    Pre-Existing Condition(s):     Assessment:   Condition Improved    Status: Discharged /  MMI  Permanent Disability:No    Plan: Medication    Diagnostics:      Comments:       Disability Information   Status: Released to Full Duty    From:  7/28/2023  Through:   Restrictions are:     Physical Restrictions   Sitting:    Standing:    Stooping:    Bending:      Squatting:    Walking:    Climbing:    Pushing:      Pulling:    Other:    Reaching Above Shoulder (L):   Reaching Above Shoulder (R):       Reaching Below Shoulder (L):    Reaching Below Shoulder (R):      Not to exceed Weight Limits   Carrying(hrs):   Weight Limit(lb):   Lifting(hrs):   Weight  Limit(lb):     Comments:      Repetitive Actions   Hands: i.e. Fine Manipulations from Grasping:     Feet: i.e. Operating Foot Controls:     Driving / Operate Machinery:     Health Care Provider’s Original  or Electronic Signature  Derek Cleveland M.D. Health Care Provider’s Original or Electronic Signature    Miah Mcguire DO MPH     Clinic Name / Location: [address] [place] [address] Clinic Phone Number: Dept: [phone]   Appointment Time: 12:30 Pm Visit Start Time: 12:35 PM   Check-In Time:  12:04 Pm Visit Discharge Time:  ***   Original-Treating Physician or Chiropractor    Page 2-Insurer/TPA    Page 3-Employer    Page 4-Employee

## 2023-07-29 NOTE — PROGRESS NOTES
"Subjective     Sabi Martinez is a 38 y.o. female who presents with Follow-Up (W/C. Has been home and has improved with headache. No nausea or SOB. )      DOI: 7/16/2023  F/u chemical fume exposure with symptoms of nausea, SOB, and HA.   She has had intermittent HA but doing well today. No current nausea or SOB. She is ready to return to work. No other aggravating or alleviating factors.       HPI    Review of Systems   Constitutional:  Negative for weight loss.   Eyes:  Negative for blurred vision.   Respiratory:  Negative for cough and hemoptysis.    Skin:  Negative for itching and rash.   Neurological:  Negative for sensory change and focal weakness.              Objective     /88   Pulse (!) 106   Temp 36.3 °C (97.3 °F) (Temporal)   Resp 16   Ht 1.549 m (5' 1\")   Wt 99.8 kg (220 lb)   SpO2 99%   BMI 41.57 kg/m²      Physical Exam    HEENT: atraumatic, normocephalic  PULM: clear to auscultation  SKIN: warm, dry without rash.                    Assessment & Plan   Urgent care visit 7/17/2023 and 7/21/2023 reviewed     1. Chemical exposure        2. Nonintractable headache, unspecified chronicity pattern, unspecified headache type          Differential diagnosis, natural history, supportive care, and indications for immediate follow-up were discussed.   Doing well.  No current headache or shortness of breath.  Discharge MMI.             "

## 2023-08-03 ENCOUNTER — OFFICE VISIT (OUTPATIENT)
Dept: URGENT CARE | Facility: PHYSICIAN GROUP | Age: 38
End: 2023-08-03
Payer: COMMERCIAL

## 2023-08-03 VITALS
WEIGHT: 222.1 LBS | OXYGEN SATURATION: 95 % | HEART RATE: 98 BPM | SYSTOLIC BLOOD PRESSURE: 120 MMHG | DIASTOLIC BLOOD PRESSURE: 88 MMHG | HEIGHT: 61 IN | TEMPERATURE: 96.8 F | BODY MASS INDEX: 41.93 KG/M2 | RESPIRATION RATE: 20 BRPM

## 2023-08-03 DIAGNOSIS — R05.1 ACUTE COUGH: ICD-10-CM

## 2023-08-03 DIAGNOSIS — U07.1 COVID: ICD-10-CM

## 2023-08-03 PROCEDURE — 1125F AMNT PAIN NOTED PAIN PRSNT: CPT

## 2023-08-03 PROCEDURE — 99213 OFFICE O/P EST LOW 20 MIN: CPT

## 2023-08-03 PROCEDURE — 3074F SYST BP LT 130 MM HG: CPT

## 2023-08-03 PROCEDURE — 3079F DIAST BP 80-89 MM HG: CPT

## 2023-08-03 RX ORDER — DEXTROMETHORPHAN HYDROBROMIDE AND PROMETHAZINE HYDROCHLORIDE 15; 6.25 MG/5ML; MG/5ML
5 SYRUP ORAL EVERY 6 HOURS PRN
Qty: 100 ML | Refills: 0 | Status: SHIPPED | OUTPATIENT
Start: 2023-08-03 | End: 2023-11-09

## 2023-08-03 RX ORDER — METHYLPREDNISOLONE 4 MG/1
TABLET ORAL
Qty: 21 TABLET | Refills: 0 | Status: SHIPPED | OUTPATIENT
Start: 2023-08-03 | End: 2023-11-09

## 2023-08-03 ASSESSMENT — ENCOUNTER SYMPTOMS
WEIGHT LOSS: 0
COUGH: 1
SORE THROAT: 1
WHEEZING: 0
HEADACHES: 1
SINUS PAIN: 0
SHORTNESS OF BREATH: 0
FEVER: 0
HEMOPTYSIS: 0
DIAPHORESIS: 0
SPUTUM PRODUCTION: 0
MYALGIAS: 1
STRIDOR: 0
CHILLS: 1

## 2023-08-03 ASSESSMENT — PAIN SCALES - GENERAL: PAINLEVEL: 8=MODERATE-SEVERE PAIN

## 2023-08-03 NOTE — PATIENT INSTRUCTIONS
"As we discussed your clinical condition would benefit from over-the-counter remedies:    FLONASE (once per day)  You may consider intranasal steroids such as fluticasone (brand name Flonase), (other options include Nasonex, Rhinocort, Nasacort) daily; continue for at least 2-3 weeks. Any generic should work as well but may cause slightly more nasal irritation. Please take according to package directions.  This steroid will help reduce inflammation of your sinuses.  This is the number one medication to help with seasonal allergies and treat nasal inflammation.  Cost: around $8 at [facility] for the generic fluticasone [facility] product (as of 5/20).    ANTIHISTAMINES  You may take a non-sedating antihistamine like Zyrtec (cetirizine) , Allegra (fexofenadine), Xyzal (levocetirizine), or Claritin (loratadine).  This will help \"dry you out\" and reduce the amount of nasal inflammation.  Follow package directions paying attention to whether they are once or twice daily.  Note: if there is a \"D\" such josselyn Allegra-D it also contains a decongestant such as sudafed.  Some patients benefit from alternating these medications every few weeks but literature does not support this.   Cost: around $11 at [facility] for the generic cetirizine [facility] branded product (as of 5/20)    SUDAFED (low dose to see if tolerated)  You may consider over-the-counter Sudafed (pseudoephedrine) to act as a decongestant to improve your breathing through your nose.  Please do not use this medication if you already have known high blood pressure.  Please take according to package directions and note that this has a stimulating effect and should not be taken late in the day.  There is a low dose 12 hour formulation and a higher dose 24 hour formulation.  Start with a low dose to make sure you tolerate the medication.  Do not take late in the day as it may interfere with sleep.   Cost: Around $6 for the generic [facility] branded product at a 10mg dose (as of " "2/2023).      BENZOCAINE DROPS  These contain the active ingredient benzocaine which is an anesthetic agent.  It helps relieve the symptoms of sore throat and may diminish your cough reflex.  Take according to package directions.  The brand name CEPACOL but the generic will suffice.  Please note that taking too frequently may cause harm - pay attention to package directions.  Cost: around $4 at [facility] for Chloroseptic drops (15 count) (as of 2/2023).      SALINE IRRIGATION/SPRAY  You may consider using a saline nasal irrigation (such as a \"Neti pot\" or similar device using sterile water, NOT tap water) or OTC saline nasal spray      NSAIDs  You may take Ibuprofen (brand name Motrin or Advil) may be taken 800 mg up to three times per day taken with food (do not exceed 2400 mg per day).  If you prefer you may consider Naproxen (brand name Naprosyn or Aleve) around 500 mg twice per day with food (do not exceed 1200 mg per day). Please do not take if you have known stomach ulcers or known kidney disease.     TYLENOL  You may take Tylenol according to package directions (1000 mg every 8 hours not to exceed 3000 mg per day).  Please do not consume with any alcohol products, or if you have known or suspected liver disease. These will help reduce inflammation, help with pain control, and can reduce fevers.    "
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
## 2023-08-03 NOTE — LETTER
August 3, 2023    To Whom It May Concern:         This is confirmation that Sabi Martinez attended her scheduled appointment with JOSR Rojo on 8/03/23. Please excuse her from work on 8/2/23 due to an acute illness. She may return to work on 8/6/23.         If you have any questions please do not hesitate to call me at the phone number listed below.    Sincerely,          LIZZY Rojo.  [phone]

## 2023-09-06 ENCOUNTER — OCCUPATIONAL MEDICINE (OUTPATIENT)
Dept: URGENT CARE | Facility: PHYSICIAN GROUP | Age: 38
End: 2023-09-06
Payer: COMMERCIAL

## 2023-09-06 VITALS
DIASTOLIC BLOOD PRESSURE: 92 MMHG | SYSTOLIC BLOOD PRESSURE: 142 MMHG | HEART RATE: 85 BPM | WEIGHT: 224.2 LBS | TEMPERATURE: 97.7 F | OXYGEN SATURATION: 97 % | BODY MASS INDEX: 42.33 KG/M2 | HEIGHT: 61 IN | RESPIRATION RATE: 16 BRPM

## 2023-09-06 DIAGNOSIS — R03.0 ELEVATED BLOOD PRESSURE READING: ICD-10-CM

## 2023-09-06 DIAGNOSIS — S61.216A LACERATION OF RIGHT LITTLE FINGER WITHOUT FOREIGN BODY WITHOUT DAMAGE TO NAIL, INITIAL ENCOUNTER: ICD-10-CM

## 2023-09-06 PROCEDURE — 3080F DIAST BP >= 90 MM HG: CPT | Performed by: PHYSICIAN ASSISTANT

## 2023-09-06 PROCEDURE — 90471 IMMUNIZATION ADMIN: CPT | Performed by: PHYSICIAN ASSISTANT

## 2023-09-06 PROCEDURE — 90715 TDAP VACCINE 7 YRS/> IM: CPT | Performed by: PHYSICIAN ASSISTANT

## 2023-09-06 PROCEDURE — 3077F SYST BP >= 140 MM HG: CPT | Performed by: PHYSICIAN ASSISTANT

## 2023-09-06 PROCEDURE — 99212 OFFICE O/P EST SF 10 MIN: CPT | Mod: 25 | Performed by: PHYSICIAN ASSISTANT

## 2023-09-06 NOTE — LETTER
"EMPLOYEE’S CLAIM FOR COMPENSATION/ REPORT OF INITIAL TREATMENT  FORM C-4  PLEASE TYPE OR PRINT    EMPLOYEE’S CLAIM - PROVIDE ALL INFORMATION REQUESTED   First Name  Sabi Last Name  Juan Birthdate                    1985                Sex  female Claim Number (Insurer’s Use Only)   Home Address  [address] Age  38 y.o. Height  1.549 m (5' 1\") Weight  102 kg (224 lb 3.2 oz) [facility]     [facility] Zip  [address] Telephone  [phone] (home)    Mailing Address  [address] [facility] Zip  [address] Primary Language Spoken  English    INSURER   THIRD-PARTY     [employer]   Employee's Occupation (Job Title) When Injury or Occupational Disease Occurred      Employer's Name/Company Name  [employer]  Telephone  [phone]    Office Mail Address (Number and Street)  [address]        Date of Injury  9/4/2023               Hours Injury  11:05 PM Date Employer Notified  9/4/2023 Last Day of Work after Injury or Occupational Disease  9/4/2023 Supervisor to Whom Injury     Reported  Rustam Martin   Address or Location of Accident (if applicable)  Work [1]   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 (Be specific and answer in detail. Use additional sheet if necessary)  finger got stuck in highboy rail with gloves on   If you believe that you have an occupational disease, when did you first have knowledge of the disability and its relationship to your employment?  NA Witnesses to the Accident (if applicable)  None      Nature of Injury or Occupational Disease  Workers' Compensation  Part(s) of Body Injured or Affected  Finger (R), N/A, N/A    I CERTIFY THAT THE ABOVE IS TRUE AND CORRECT TO T HE BEST OF MY KNOWLEDGE AND THAT I HAVE PROVIDED THIS INFORMATION IN ORDER TO OBTAIN THE " BENEFITS OF [place], OR [place]).  I HEREBY AUTHORIZE ANY PHYSICIAN, CHIROPRACTOR, SURGEON, PRACTITIONER OR ANY OTHER PERSON, ANY HOSPITAL, INCLUDING [facility], ANY  MEDICAL SERVICE ORGANIZATION, ANY INSURANCE COMPANY, OR OTHER INSTITUTION OR ORGANIZATION TO RELEASE TO EACH OTHER, ANY MEDICAL OR OTHER INFORMATION, INCLUDING BENEFITS PAID OR PAYABLE, PERTINENT TO THIS INJURY OR DISEASE, EXCEPT INFORMATION RELATIVE TO DIAGNOSIS, TREATMENT AND/OR COUNSELING FOR AIDS, PSYCHOLOGICAL CONDITIONS, ALCOHOL OR CONTROLLED SUBSTANCES, FOR WHICH I MUST GIVE SPECIFIC AUTHORIZATION.  A PHOTOSTAT OF THIS AUTHORIZATION SHALL BE VALID AS THE ORIGINAL.       Date   Place Employee’s Original or  *Electronic Signature   THIS REPORT MUST BE COMPLETED AND MAILED WITHIN 3 WORKING DAYS OF TREATMENT   Place  [facility]  Name of Facility  [place]   Date  9/6/2023 Diagnosis and Description of Injury or Occupational Disease  (S61.216A) Laceration of right little finger without foreign body without damage to nail, initial encounter Is there evidence that the injured employee was under the influence of alcohol and/or another controlled substance at the time of accident?  ? No ? Yes (if yes, please explain)   Hour  9:08 AM   The encounter diagnosis was Laceration of right little finger without foreign body without damage to nail, initial encounter. No   Treatment  Wound is healing well.  Tdap updated today.  Return precautions reviewed.  Patient discharged/MMI.    Have you advised the patient to remain off work five days or     more?    X-Ray Findings      ? Yes Indicate dates:   From   To      From information given by the employee, together with medical evidence, can        you directly connect this injury or occupational disease as job incurred?  Yes ? No If no, is the injured employee capable of:  ?  "full duty  Yes ? modified duty      Is additional medical care by a physician indicated?  No If modified duty, specify any limitations / restrictions      Do you know of any previous injury or disease contributing to this condition or occupational disease?  ? Yes ? No (Explain if yes)                          No   Date  9/6/2023 Print Health Care Provider's  Name  Princess Lewis P.A.-C. I certify that the employer’s copy of  this form was delivered to the employer on:   Address  [address] Insurer’s Use Only     [facility]  [address]    Provider’s Tax ID Number  [id number] Telephone  Dept: [phone]             Health Care Provider’s Original or Electronic Signature  e-SignPRINCESS LEWIS P.A.-C. Degree (MD,, DC,ANDREW,APRN)  ANDREW      * Complete and attach Release of Information (Form C-4A) when injured employee signs C-4 Form electronically  ORIGINAL - TREATING HEALTHCARE PROVIDER PAGE 2 - INSURER/TPA PAGE 3 - EMPLOYER PAGE 4 - EMPLOYEE             Form C-4 (rev.08/21)           BRIEF DESCRIPTION OF RIGHTS AND BENEFITS  (Pursuant to [place] 616[place])    Notice of Injury or Occupational Disease (Incident Report Form C-1): If an injury or occupational disease (OD) arises out of and in the course of employment, you must provide written notice to your employer as soon as practicable, but no later than 7 days after the accident or OD. Your employer shall maintain a sufficient supply of the required forms.    Claim for Compensation (Form C-4): If medical treatment is sought, the form C-4 is available at the place of initial treatment. A completed \"Claim for Compensation\" (Form C-4) must be filed within 90 days after an accident or OD. The treating physician or chiropractor must, within 3 working days after treatment, complete and mail to the employer, the employer's insurer and third-party , the Claim for Compensation.    Medical Treatment: If you require medical treatment for your " on-the-job injury or OD, you may be required to select a physician or chiropractor from a list provided by your workers’ compensation insurer, if it has contracted with an Organization for Managed Care (MCO) or Preferred Provider Organization (PPO) or providers of health care. If your employer has not entered into a contract with an MCO or PPO, you may select a physician or chiropractor from the Panel of Physicians and Chiropractors. Any medical costs related to your industrial injury or OD will be paid by your insurer.    Temporary Total Disability (TTD): If your doctor has certified that you are unable to work for a period of at least 5 consecutive days, or 5 cumulative days in a 20-day period, or places restrictions on you that your employer does not accommodate, you may be entitled to TTD compensation.    Temporary Partial Disability (TPD): If the wage you receive upon reemployment is less than the compensation for TTD to which you are entitled, the insurer may be required to pay you TPD compensation to make up the difference. TPD can only be paid for a maximum of 24 months.    Permanent Partial Disability (PPD): When your medical condition is stable and there is an indication of a PPD as a result of your injury or OD, within 30 days, your insurer must arrange for an evaluation by a rating physician or chiropractor to determine the degree of your PPD. The amount of your PPD award depends on the date of injury, the results of the PPD evaluation, your age and wage.    Permanent Total Disability (PTD): If you are medically certified by a treating physician or chiropractor as permanently and totally disabled and have been granted a PTD status by your insurer, you are entitled to receive monthly benefits not to exceed 66 2/3% of your average monthly wage. The amount of your PTD payments is subject to reduction if you previously received a lump-sum PPD award.    Vocational Rehabilitation Services: You may be eligible  for vocational rehabilitation services if you are unable to return to the job due to a permanent physical impairment or permanent restrictions as a result of your injury or occupational disease.    Transportation and Per Guerda Reimbursement: You may be eligible for travel expenses and per guerda associated with medical treatment.    Reopening: You may be able to reopen your claim if your condition worsens after claim closure.     Appeal Process: If you disagree with a written determination issued by the insurer or the insurer does not respond to your request, you may appeal to the Department of Administration, , by following the instructions contained in your determination letter. You must appeal the determination within 70 days from the date of the determination letter at [address], [address], [place], [place] [address], or [address], [place], [place] [address]. If you disagree with the  decision, you may appeal to the Department of Administration, . You must file your appeal within 30 days from the date of the  decision letter at [address], [address], [place], [place] [address], or [address], [address], [place], [place] [address]. If you disagree with a decision of an , you may file a petition for judicial review with the District Court. You must do so within 30 days of the Appeal Officer’s decision. You may be represented by an  at your own expense or you may contact the [facility] for possible representation.    [place]  for Injured Workers (NAIW): If you disagree with a  decision, you may request that NAIW represent you without charge at an  Hearing. For information regarding denial of benefits, you may contact the [facility] at: [address], [address], [place] [address], [phone], or [address], [place] [address],  [phone]    To File a Complaint with the Division: If you wish to file a complaint with the  of the Division of Industrial Relations (DIR),  please contact the Workers’ Compensation Section, [address], [address], [place], [place] [address], telephone (688) [phone], or [address], [address], [place], [place] [address], telephone [phone] [phone].    For assistance with Workers’ Compensation Issues: You may contact the [facility] Office for Consumer Health Assistance, [address], [address], [place], [place] [address], Toll Free 1-400.405.8280, Web site: http://[facility].[place].gov/Programs/SIRI E-mail: siri@[email or web address].nv.[facility]              __________________________________________________________________                                    _________________            Employee Name / Signature                                                                                                                            Date                                                                                                                                                                                                                              D-2 (rev. 10/20)

## 2023-09-06 NOTE — LETTER
[address] [place] [address]  Phone:  [phone] - Fax:  [phone]   [facility] Progress Report and Disability Certification  Date of Service: 9/6/2023   No Show:  No  Date / Time of Next Visit:     Claim Information   Patient Name: Sabi Martinez  Claim Number:     Employer: [employer]  Date of Injury: 9/4/2023     Insurer / TPA: [employer]  ID / SSN:     Occupation:   Diagnosis: The encounter diagnosis was Laceration of right little finger without foreign body without damage to nail, initial encounter.    Medical Information   Related to Industrial Injury? Yes    Subjective Complaints:  Date of Injury:  9/4/2023.  Patient presents for evaluation of puncture wound to the right pinky finger that occurred at work.  She states that a piece of metal pierced the finger.  Wound was cleaned with antibacterial soap and water and dressed with bandages and glue.  The wound is healing well and patient denies redness, pain, swelling, difficulty ranging the finger, numbness and tingling.  Patient believes that her tetanus is out of date, however.     Objective Findings: Well-healed irregular superficial laceration on the distal volar right little finger.  No surrounding erythema or edema.  Nontender to palpation.  Digit range of motion within normal limits.  Sensation is intact and cap refill is brisk.      Pre-Existing Condition(s):     Assessment:   Initial Visit    Status: Discharged /  MMI  Permanent Disability:No    Plan:      Diagnostics:      Comments:  Wound is healing well.  Tdap updated today.  Return precautions reviewed.  Patient discharged/MMI.      Disability Information   Status: Released to Full Duty    From:     Through:   Restrictions are:     Physical Restrictions   Sitting:    Standing:    Stooping:    Bending:      Squatting:    Walking:    Climbing:    Pushing:      Pulling:    Other:    Reaching  Above Shoulder (L):   Reaching Above Shoulder (R):       Reaching Below Shoulder (L):    Reaching Below Shoulder (R):      Not to exceed Weight Limits   Carrying(hrs):   Weight Limit(lb):   Lifting(hrs):   Weight  Limit(lb):     Comments:      Repetitive Actions   Hands: i.e. Fine Manipulations from Grasping:     Feet: i.e. Operating Foot Controls:     Driving / Operate Machinery:     Health Care Provider’s Original or Electronic Signature  Allen Tesfaye P.A.-C. Health Care Provider’s Original or Electronic Signature    Miah Mcguire DO MPH     Clinic Name / Location: [address] [address] Clinic Phone Number: Dept: [phone]   Appointment Time: 8:45 Am Visit Start Time: 9:08 AM   Check-In Time:  8:44 Am Visit Discharge Time: 9:40 AM   Original-Treating Physician or Chiropractor    Page 2-Insurer/TPA    Page 3-Employer    Page 4-Employee

## 2023-09-06 NOTE — PROGRESS NOTES
"Subjective:   Sabi Martinez is a 38 y.o. female who presents for Joint Injection (Pinky on rt finger was stuck with metal x 2 days W/C)        Date of Injury:  9/4/2023.  Patient presents for evaluation of puncture wound to the right pinky finger that occurred at work.  She states that a piece of metal pierced the finger.  Wound was cleaned with antibacterial soap and water and dressed with bandages and glue.  The wound is healing well and patient denies redness, pain, swelling, difficulty ranging the finger, numbness and tingling.  Patient believes that her tetanus is out of date, however.        ROS    PMH: Past medical history reviewed in Epic  MEDS: Medications were reviewed in Epic  ALLERGIES: Allergies were reviewed in Epic  FH: Family history was reviewed, no pertinent findings to report   Objective:   BP (!) 142/92   Pulse 85   Temp 36.5 °C (97.7 °F) (Temporal)   Resp 16   Ht 1.549 m (5' 1\")   Wt 102 kg (224 lb 3.2 oz)   SpO2 97%   BMI 42.36 kg/m²   Physical Exam  Vitals reviewed.   Constitutional:       General: She is not in acute distress.     Appearance: Normal appearance. She is well-developed. She is not toxic-appearing.   HENT:      Head: Normocephalic and atraumatic.      Right Ear: External ear normal.      Left Ear: External ear normal.      Nose: Nose normal.   Cardiovascular:      Rate and Rhythm: Normal rate and regular rhythm.   Pulmonary:      Effort: Pulmonary effort is normal. No respiratory distress.      Breath sounds: No stridor.   Musculoskeletal:      Comments: Well-healed irregular superficial laceration on the distal volar right little finger.  No surrounding erythema or edema.  Nontender to palpation.  Digit range of motion within normal limits.  Sensation is intact and cap refill is brisk.   Skin:     General: Skin is dry.   Neurological:      Comments: Alert and oriented.    Psychiatric:         Speech: Speech normal.         Behavior: Behavior normal.         "   Assessment/Plan:   1. Laceration of right little finger without foreign body without damage to nail, initial encounter  - Tdap =>10yo IM    Wound is healing well.  Tdap updated today.  Return precautions reviewed.  Patient discharged/MMI.

## 2023-09-14 ENCOUNTER — HOSPITAL ENCOUNTER (OUTPATIENT)
Dept: LAB | Facility: MEDICAL CENTER | Age: 38
End: 2023-09-14
Attending: NURSE PRACTITIONER
Payer: COMMERCIAL

## 2023-09-14 DIAGNOSIS — Z13.1 SCREENING FOR DIABETES MELLITUS: ICD-10-CM

## 2023-09-14 DIAGNOSIS — Z86.2 HISTORY OF ANEMIA: ICD-10-CM

## 2023-09-14 DIAGNOSIS — E66.09 CLASS 2 OBESITY DUE TO EXCESS CALORIES WITHOUT SERIOUS COMORBIDITY WITH BODY MASS INDEX (BMI) OF 39.0 TO 39.9 IN ADULT: ICD-10-CM

## 2023-09-14 DIAGNOSIS — R53.83 OTHER FATIGUE: ICD-10-CM

## 2023-09-14 DIAGNOSIS — Z23 NEED FOR VACCINATION: ICD-10-CM

## 2023-09-14 DIAGNOSIS — E55.9 VITAMIN D DEFICIENCY: ICD-10-CM

## 2023-09-14 DIAGNOSIS — Z13.220 SCREENING FOR LIPID DISORDERS: ICD-10-CM

## 2023-09-14 LAB
25(OH)D3 SERPL-MCNC: 22 NG/ML (ref 30–100)
ALBUMIN SERPL BCP-MCNC: 3.9 G/DL (ref 3.2–4.9)
ALBUMIN/GLOB SERPL: 1.4 G/DL
ALP SERPL-CCNC: 64 U/L (ref 30–99)
ALT SERPL-CCNC: 23 U/L (ref 2–50)
ANION GAP SERPL CALC-SCNC: 14 MMOL/L (ref 7–16)
AST SERPL-CCNC: 17 U/L (ref 12–45)
BASOPHILS # BLD AUTO: 0.4 % (ref 0–1.8)
BASOPHILS # BLD: 0.04 K/UL (ref 0–0.12)
BILIRUB SERPL-MCNC: 0.5 MG/DL (ref 0.1–1.5)
BUN SERPL-MCNC: 8 MG/DL (ref 8–22)
CALCIUM ALBUM COR SERPL-MCNC: 8.6 MG/DL (ref 8.5–10.5)
CALCIUM SERPL-MCNC: 8.5 MG/DL (ref 8.5–10.5)
CHLORIDE SERPL-SCNC: 107 MMOL/L (ref 96–112)
CHOLEST SERPL-MCNC: 164 MG/DL (ref 100–199)
CO2 SERPL-SCNC: 18 MMOL/L (ref 20–33)
CREAT SERPL-MCNC: 0.62 MG/DL (ref 0.5–1.4)
EOSINOPHIL # BLD AUTO: 0.2 K/UL (ref 0–0.51)
EOSINOPHIL NFR BLD: 2.2 % (ref 0–6.9)
ERYTHROCYTE [DISTWIDTH] IN BLOOD BY AUTOMATED COUNT: 41.5 FL (ref 35.9–50)
EST. AVERAGE GLUCOSE BLD GHB EST-MCNC: 111 MG/DL
FASTING STATUS PATIENT QL REPORTED: NORMAL
FERRITIN SERPL-MCNC: 85.8 NG/ML (ref 10–291)
FOLATE SERPL-MCNC: 12.7 NG/ML
GFR SERPLBLD CREATININE-BSD FMLA CKD-EPI: 118 ML/MIN/1.73 M 2
GLOBULIN SER CALC-MCNC: 2.7 G/DL (ref 1.9–3.5)
GLUCOSE SERPL-MCNC: 100 MG/DL (ref 65–99)
HBA1C MFR BLD: 5.5 % (ref 4–5.6)
HCT VFR BLD AUTO: 43 % (ref 37–47)
HDLC SERPL-MCNC: 39 MG/DL
HGB BLD-MCNC: 13.7 G/DL (ref 12–16)
IMM GRANULOCYTES # BLD AUTO: 0.04 K/UL (ref 0–0.11)
IMM GRANULOCYTES NFR BLD AUTO: 0.4 % (ref 0–0.9)
IRON SATN MFR SERPL: 15 % (ref 15–55)
IRON SERPL-MCNC: 52 UG/DL (ref 40–170)
LDLC SERPL CALC-MCNC: 106 MG/DL
LYMPHOCYTES # BLD AUTO: 2.19 K/UL (ref 1–4.8)
LYMPHOCYTES NFR BLD: 24.3 % (ref 22–41)
MCH RBC QN AUTO: 27.3 PG (ref 27–33)
MCHC RBC AUTO-ENTMCNC: 31.9 G/DL (ref 32.2–35.5)
MCV RBC AUTO: 85.7 FL (ref 81.4–97.8)
MONOCYTES # BLD AUTO: 0.55 K/UL (ref 0–0.85)
MONOCYTES NFR BLD AUTO: 6.1 % (ref 0–13.4)
NEUTROPHILS # BLD AUTO: 5.99 K/UL (ref 1.82–7.42)
NEUTROPHILS NFR BLD: 66.6 % (ref 44–72)
NRBC # BLD AUTO: 0 K/UL
NRBC BLD-RTO: 0 /100 WBC (ref 0–0.2)
PLATELET # BLD AUTO: 234 K/UL (ref 164–446)
PMV BLD AUTO: 11.4 FL (ref 9–12.9)
POTASSIUM SERPL-SCNC: 4.3 MMOL/L (ref 3.6–5.5)
PROT SERPL-MCNC: 6.6 G/DL (ref 6–8.2)
RBC # BLD AUTO: 5.02 M/UL (ref 4.2–5.4)
SODIUM SERPL-SCNC: 139 MMOL/L (ref 135–145)
TIBC SERPL-MCNC: 344 UG/DL (ref 250–450)
TRANSFERRIN SERPL-MCNC: 267 MG/DL (ref 200–370)
TRIGL SERPL-MCNC: 93 MG/DL (ref 0–149)
TSH SERPL DL<=0.005 MIU/L-ACNC: 1.37 UIU/ML (ref 0.38–5.33)
UIBC SERPL-MCNC: 292 UG/DL (ref 110–370)
WBC # BLD AUTO: 9 K/UL (ref 4.8–10.8)

## 2023-09-14 PROCEDURE — 84443 ASSAY THYROID STIM HORMONE: CPT

## 2023-09-14 PROCEDURE — 82746 ASSAY OF FOLIC ACID SERUM: CPT

## 2023-09-14 PROCEDURE — 84466 ASSAY OF TRANSFERRIN: CPT

## 2023-09-14 PROCEDURE — 80061 LIPID PANEL: CPT

## 2023-09-14 PROCEDURE — 85025 COMPLETE CBC W/AUTO DIFF WBC: CPT

## 2023-09-14 PROCEDURE — 82306 VITAMIN D 25 HYDROXY: CPT

## 2023-09-14 PROCEDURE — 80053 COMPREHEN METABOLIC PANEL: CPT

## 2023-09-14 PROCEDURE — 36415 COLL VENOUS BLD VENIPUNCTURE: CPT

## 2023-09-14 PROCEDURE — 82728 ASSAY OF FERRITIN: CPT

## 2023-09-14 PROCEDURE — 83036 HEMOGLOBIN GLYCOSYLATED A1C: CPT

## 2023-09-14 PROCEDURE — 83540 ASSAY OF IRON: CPT

## 2023-09-14 PROCEDURE — 83550 IRON BINDING TEST: CPT

## 2023-11-09 ENCOUNTER — OCCUPATIONAL MEDICINE (OUTPATIENT)
Dept: URGENT CARE | Facility: PHYSICIAN GROUP | Age: 38
End: 2023-11-09
Payer: COMMERCIAL

## 2023-11-09 VITALS
HEIGHT: 61 IN | BODY MASS INDEX: 42.29 KG/M2 | DIASTOLIC BLOOD PRESSURE: 96 MMHG | HEART RATE: 93 BPM | SYSTOLIC BLOOD PRESSURE: 128 MMHG | TEMPERATURE: 97.6 F | RESPIRATION RATE: 18 BRPM | OXYGEN SATURATION: 18 % | WEIGHT: 224 LBS

## 2023-11-09 DIAGNOSIS — S46.811A STRAIN OF RIGHT TRAPEZIUS MUSCLE, INITIAL ENCOUNTER: ICD-10-CM

## 2023-11-09 DIAGNOSIS — S16.1XXA STRAIN OF NECK MUSCLE, INITIAL ENCOUNTER: ICD-10-CM

## 2023-11-09 PROCEDURE — 3080F DIAST BP >= 90 MM HG: CPT | Performed by: FAMILY MEDICINE

## 2023-11-09 PROCEDURE — 99213 OFFICE O/P EST LOW 20 MIN: CPT | Performed by: FAMILY MEDICINE

## 2023-11-09 PROCEDURE — 3074F SYST BP LT 130 MM HG: CPT | Performed by: FAMILY MEDICINE

## 2023-11-09 ASSESSMENT — ENCOUNTER SYMPTOMS: FEVER: 0

## 2023-11-09 ASSESSMENT — FIBROSIS 4 INDEX: FIB4 SCORE: 0.55

## 2023-11-09 NOTE — LETTER
"    EMPLOYEE’S CLAIM FOR COMPENSATION/ REPORT OF INITIAL TREATMENT  FORM C-4  PLEASE TYPE OR PRINT    EMPLOYEE’S CLAIM - PROVIDE ALL INFORMATION REQUESTED   First Name                    CHIRAG Celestin Last Name  Juan Birthdate                    1985                Sex  []M  []F Claim Number (Insurer’s Use Only)     Home Address  [address] Age  38 y.o. Height  1.549 m (5' 1\") Weight  102 kg (224 lb) Social Security Number     [facility] Zip  [address] Telephone  [phone] (home)    Mailing Address  [address] [facility] Zip  [address] Primary Language Spoken  English    INSURER   THIRD-PARTY   [employer]   Employee's Occupation (Job Title) When Injury or Occupational Disease Occurred      Employer's Name/Company Name  [employer]  Telephone  [phone]    Office Mail Address (Number and Street)  [address]     Date of Injury (if applicable) 8/17/2023               Hours Injury (if applicable)            am               pm Date Employer Notified  8/17/2023 Last Day of Work after Injury or Occupational Disease  11/8/2023 Supervisor to Whom Injury     Reported  Mo Dickerson   Address or Location of Accident (if applicable)  Work [1]   What were you doing at the time of accident? (if applicable)  working [facility]    How did this injury or occupational disease occur? (Be specific and answer in detail. Use additional sheet if necessary)  repetitive lifting of boxes and pulling dream liner   If you believe that you have an occupational disease, when did you first have knowledge of the disability and its relationship to your employment?  n/a Witnesses to the Accident (if applicable)  kash adams (work right EE)      Nature of Injury or Occupational Disease  Workers' Compensation  Part(s) of Body Injured or Affected  Shoulder (L) Shoulder (R) " Upper Back Area (Thoracic Area)    I CERTIFY THAT THE ABOVE IS TRUE AND CORRECT TO T HE BEST OF MY KNOWLEDGE AND THAT I HAVE PROVIDED THIS INFORMATION IN ORDER TO OBTAIN THE BENEFITS OF [place], OR [place]).  I HEREBY AUTHORIZE ANY PHYSICIAN, CHIROPRACTOR, SURGEON, PRACTITIONER OR ANY OTHER PERSON, ANY HOSPITAL, INCLUDING [facility] OR [facility], ANY  MEDICAL SERVICE ORGANIZATION, ANY INSURANCE COMPANY, OR OTHER INSTITUTION OR ORGANIZATION TO RELEASE TO EACH OTHER, ANY MEDICAL OR OTHER INFORMATION, INCLUDING BENEFITS PAID OR PAYABLE, PERTINENT TO THIS INJURY OR DISEASE, EXCEPT INFORMATION RELATIVE TO DIAGNOSIS, TREATMENT AND/OR COUNSELING FOR AIDS, PSYCHOLOGICAL CONDITIONS, ALCOHOL OR CONTROLLED SUBSTANCES, FOR WHICH I MUST GIVE SPECIFIC AUTHORIZATION.  A PHOTOSTAT OF THIS AUTHORIZATION SHALL BE VALID AS THE ORIGINAL.     Date 11/09/2023   [facility] Employee’s Original or  *Electronic Signature   THIS REPORT MUST BE COMPLETED AND MAILED WITHIN 3 WORKING DAYS OF TREATMENT   Place  [facility]    Name of Facility  [facility]   Date 11/9/2023 Diagnosis and Description of Injury or Occupational Disease  (S46.811A) Strain of right trapezius muscle, initial encounter  (S16.1XXA) Strain of neck muscle, initial encounter  Diagnoses of Strain of right trapezius muscle, initial encounter and Strain of neck muscle, initial encounter were pertinent to this visit. Is there evidence that the injured employee was under the influence of alcohol and/or another controlled substance at the time of accident?  []No  [] Yes (if yes, please explain)   Hour 9:29 AM  No   Treatment: Physical therapy, transfer care to occupational medicine     Have you advised the patient to remain off work five days or more?   [] Yes Indicate dates: From   To    []No If no, is the injured employee capable of: [] full  duty [] modified duty                                                             Yes     If modified duty, specify any limitations / restrictions:                                                                                                                                                                                                                                                                                                                                                                                                                  X-Ray Findings:      From information given by the employee, together with medical evidence, can you directly connect this injury or occupational disease as job incurred?  []Yes   [] No Yes    Is additional medical care by a physician indicated? []Yes [] No  Yes    Do you know of any previous injury or disease contributing to this condition or occupational disease? []Yes [] No (Explain if yes)                          No   Date  11/9/2023 Print Health Care Provider’s Name  Loly Childress M.D. I certify that the employer’s copy of  this form was delivered to the employer on:   Address  [address] INSURER'S USE ONLY                       [place] [facility]  [address] Provider’s Tax ID Number  [id number]   Telephone  Dept: [phone]    Health Care Provider’s Original or Electronic Signature  e-LOLY Jaramillo M.D. Degree (MD,DO, DC,PA-C,APRN)    Choose (if applicable)      ORIGINAL - TREATING HEALTHCARE PROVIDER PAGE 2 - INSURER/TPA PAGE 3 - EMPLOYER PAGE 4 - EMPLOYEE             Form C-4 (rev.08/23)        BRIEF DESCRIPTION OF RIGHTS AND BENEFITS  (Pursuant to [place] 616C.050)    Notice of Injury or Occupational Disease (Incident Report Form C-1): If an injury or occupational disease (OD) arises out of and in the course of employment, you must provide written notice to your employer as soon as practicable, but no later than 7 days after the  "accident or OD. Your employer shall maintain a sufficient supply of the required forms.    Claim for Compensation (Form C-4): If medical treatment is sought, the form C-4 is available at the place of initial treatment. A completed \"Claim for Compensation\" (Form C-4) must be filed within 90 days after an accident or OD. The treating physician or chiropractor must, within 3 working days after treatment, complete and mail to the employer, the employer's insurer and third-party , the Claim for Compensation.    Medical Treatment: If you require medical treatment for your on-the-job injury or OD, you may be required to select a physician or chiropractor from a list provided by your workers’ compensation insurer, if it has contracted with an Organization for Managed Care (MCO) or Preferred Provider Organization (PPO) or providers of health care. If your employer has not entered into a contract with an MCO or PPO, you may select a physician or chiropractor from the Panel of Physicians and Chiropractors. Any medical costs related to your industrial injury or OD will be paid by your insurer.    Temporary Total Disability (TTD): If your doctor has certified that you are unable to work for a period of at least 5 consecutive days, or 5 cumulative days in a 20-day period, or places restrictions on you that your employer does not accommodate, you may be entitled to TTD compensation.    Temporary Partial Disability (TPD): If the wage you receive upon reemployment is less than the compensation for TTD to which you are entitled, the insurer may be required to pay you TPD compensation to make up the difference. TPD can only be paid for a maximum of 24 months.    Permanent Partial Disability (PPD): When your medical condition is stable and there is an indication of a PPD as a result of your injury or OD, within 30 days, your insurer must arrange for an evaluation by a rating physician or chiropractor to determine the " degree of your PPD. The amount of your PPD award depends on the date of injury, the results of the PPD evaluation, your age and wage.    Permanent Total Disability (PTD): If you are medically certified by a treating physician or chiropractor as permanently and totally disabled and have been granted a PTD status by your insurer, you are entitled to receive monthly benefits not to exceed 66 2/3% of your average monthly wage. The amount of your PTD payments is subject to reduction if you previously received a lump-sum PPD award.    Vocational Rehabilitation Services: You may be eligible for vocational rehabilitation services if you are unable to return to the job due to a permanent physical impairment or permanent restrictions as a result of your injury or occupational disease.    Transportation and Per Guerda Reimbursement: You may be eligible for travel expenses and per guerda associated with medical treatment.    Reopening: You may be able to reopen your claim if your condition worsens after claim closure.     Appeal Process: If you disagree with a written determination issued by the insurer or the insurer does not respond to your request, you may appeal to the Department of Administration, , by following the instructions contained in your determination letter. You must appeal the determination within 70 days from the date of the determination letter at [address], [address], [place], [place] [address], or [address], [place] [address]. If you disagree with the  decision, you may appeal to the Department of Administration, . You must file your appeal within 30 days from the date of the  decision letter at [address], [address], [place] [address], or [address] [facility] [address], [place] [address]. If you disagree with a decision of an , you may file a petition for judicial review  with the District Court. You must do so within 30 days of the Appeal Officer’s decision. You may be represented by an  at your own expense or you may contact the [facility] for possible representation.    [place]  for Injured Workers (NAIW): If you disagree with a  decision, you may request that NAIW represent you without charge at an  Hearing. For information regarding denial of benefits, you may contact the [facility] at: [address], [address], [place] [address], [phone], or [address], [address], [place] [address], [phone]    To File a Complaint with the Division: If you wish to file a complaint with the  of the Division of Industrial Relations (DIR),  please contact the Workers’ Compensation Section, [address], [address], [place], [place] [address], telephone [phone], or [address], [address], [place], [place] [address], telephone [phone].    For assistance with Workers’ Compensation Issues: You may contact the [facility] Office for Consumer Health Assistance, [address], [address], [place], [place] [address], Toll Free 1-278.988.8241, Web site: [email or web address].[place].gov/Programs/JOSE MARTIN E-mail: [email or web address]              __________________________________________________________________                                    11/09/2023            Employee Name / Signature                                                                                                                            Date                                                                                                                                                                                                                              D-2 (rev. 10/20)

## 2023-11-09 NOTE — PROGRESS NOTES
"Subjective:     Sabi Martinez is a 38 y.o. female who presents for Shoulder Pain (Stiff shoulders and neck from repetitive lifting and pulling. Hard to move arms and neck. X3 months/Been seeing physical therapist at work )    HPI  Pt presents for evaluation of an acute problem  DOI: 8/17/23   TLAIA: Repetitive lifting of boxes at work causing pain in bilateral shoulders/arms/neck  Has pain mainly at the right superior shoulder/neck area   Pain is worse when she abducts shoulders   Has pain throughout the day   Pain is throbbing   Stretching frequently without much improvement   No radiation down the arm to elbow or hands  Patient has been seen on site physical therapist without much improvement    Review of Systems   Constitutional:  Negative for fever.   Skin:  Negative for rash.       PMH: Past medical history reviewed in Epic  MEDS: Medications were reviewed in Epic  ALLERGIES: Allergies were reviewed in Epic     Objective:   BP (!) 128/96   Pulse 93   Temp 36.4 °C (97.6 °F) (Temporal)   Resp 18   Ht 1.549 m (5' 1\")   Wt 102 kg (224 lb)   SpO2 (!) 18%   BMI 42.32 kg/m²     Physical Exam  Constitutional:       General: She is not in acute distress.     Appearance: She is well-developed. She is not diaphoretic.   Pulmonary:      Effort: Pulmonary effort is normal.   Neurological:      Mental Status: She is alert.     Neck  General: No asymmetry, bruising, or erythema appreciated  ROM: FROM flex/extend/lateral bend/lateral rotation  Palpation: No TTP of spinous processes, no step-offs appreciated, +TTP along right paraspinal muscles and right superior trapezius  Special tests: Neg Spurling's  Neuro: Sensation intact     Right shoulder  General: no gross deformity  ROM: flex 135, ER 60, IR equal  Palpation: TTP inferior to lateral acromion  Strength: 5/5 abduction, 5/5 ER, 5/5 IR  Rotator Cuff: Empty can -, External rotation lag -  Impingement: Neer’s -, Novak -  Biceps: Speed’s -  AC Joint: Cross body " -  Stability: Apprehension -  Neuro: Sensation equal and intact bilaterally  Pulses: radial, ulnar 2+    Assessment/Plan:   Assessment    1. Strain of right trapezius muscle, initial encounter  - Referral to Occupational Medicine  - Referral to Physical Therapy    2. 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  Not making adequate improvements with conservative measures and will send to physical therapy and occupational medicine.  Follow-up in the urgent care as needed.

## 2023-11-09 NOTE — LETTER
[facility]  [address] [place] [address] JU[place] Beltran [address]  Phone:  [phone] - Fax:  [phone]   [facility] Progress Report and Disability Certification  Date of Service: 11/9/2023   No Show:  No  Date / Time of Next Visit: 11/20/2023   Claim Information   Patient Name: Sabi Martinez  Claim Number:     Employer: [employer]  Date of Injury: 8/17/2023     Insurer / TPA: [employer]  ID / SSN:     Occupation:   Diagnosis: Diagnoses of Strain of right trapezius muscle, initial encounter and Strain of neck muscle, initial encounter were pertinent to this visit.    Medical Information   Related to Industrial Injury? Yes    Subjective Complaints:  Pt presents for evaluation of an acute problem  DOI: 8/17/23   TALIA: Repetitive lifting of boxes at work causing pain in bilateral shoulders/arms/neck  Has pain mainly at the right superior shoulder/neck area   Pain is worse when she abducts shoulders   Has pain throughout the day   Pain is throbbing   Stretching frequently without much improvement   No radiation down the arm to elbow or hands  Patient has been seen on site physical therapist without much improvement   Objective Findings: Neck  General: No asymmetry, bruising, or erythema appreciated  ROM: FROM flex/extend/lateral bend/lateral rotation  Palpation: No TTP of spinous processes, no step-offs appreciated, +TTP along right paraspinal muscles and right superior trapezius  Special tests: Neg Spurling's  Neuro: Sensation intact     Right shoulder  General: no gross deformity  ROM: flex 135, ER 60, IR equal  Palpation: TTP inferior to lateral acromion  Strength: 5/5 abduction, 5/5 ER, 5/5 IR  Rotator Cuff: Empty can -, External rotation lag -  Impingement: Neer's -, Novak -  Biceps: Speed's -  AC Joint: Cross body -  Stability: Apprehension -  Neuro: Sensation equal and intact bilaterally  Pulses: radial, ulnar 2+;   Pre-Existing  Condition(s):     Assessment:   Initial Visit    Status: Additional Care Required  Permanent Disability:No    Plan: Transfer Care    Diagnostics:      Comments:       Disability Information   Status: Released to Full Duty    From:  11/9/2023  Through: 11/20/2023 Restrictions are: Temporary   Physical Restrictions   Sitting:    Standing:    Stooping:    Bending:      Squatting:    Walking:    Climbing:    Pushing:      Pulling:    Other:    Reaching Above Shoulder (L):   Reaching Above Shoulder (R):       Reaching Below Shoulder (L):    Reaching Below Shoulder (R):      Not to exceed Weight Limits   Carrying(hrs):   Weight Limit(lb):   Lifting(hrs):   Weight  Limit(lb):     Comments:      Repetitive Actions   Hands: i.e. Fine Manipulations from Grasping:     Feet: i.e. Operating Foot Controls:     Driving / Operate Machinery:     Health Care Provider’s Original or Electronic Signature  Mo Childress M.D. Health Care Provider’s Original or Electronic Signature    Miah Mcguire DO MPH     Clinic Name / Location: [address] [address] Clinic Phone Number: Dept: [phone]   Appointment Time: 9:00 Am Visit Start Time: 9:29 AM   Check-In Time:  9:27 Am Visit Discharge Time:  10:17 AM    Original-Treating Physician or Chiropractor    Page 2-Insurer/TPA    Page 3-Employer    Page 4-Employee

## 2023-11-16 ENCOUNTER — OCCUPATIONAL MEDICINE (OUTPATIENT)
Dept: OCCUPATIONAL MEDICINE | Facility: CLINIC | Age: 38
End: 2023-11-16
Payer: COMMERCIAL

## 2023-11-16 VITALS
SYSTOLIC BLOOD PRESSURE: 138 MMHG | RESPIRATION RATE: 18 BRPM | BODY MASS INDEX: 42.29 KG/M2 | OXYGEN SATURATION: 96 % | HEIGHT: 61 IN | HEART RATE: 96 BPM | WEIGHT: 224 LBS | DIASTOLIC BLOOD PRESSURE: 84 MMHG

## 2023-11-16 DIAGNOSIS — S46.912D STRAIN OF LEFT SHOULDER, SUBSEQUENT ENCOUNTER: ICD-10-CM

## 2023-11-16 DIAGNOSIS — S46.911D STRAIN OF RIGHT SHOULDER, SUBSEQUENT ENCOUNTER: ICD-10-CM

## 2023-11-16 DIAGNOSIS — S29.019D THORACIC MYOFASCIAL STRAIN, SUBSEQUENT ENCOUNTER: ICD-10-CM

## 2023-11-16 DIAGNOSIS — S16.1XXD STRAIN OF NECK MUSCLE, SUBSEQUENT ENCOUNTER: ICD-10-CM

## 2023-11-16 DIAGNOSIS — S46.819D STRAIN OF TRAPEZIUS MUSCLE, UNSPECIFIED LATERALITY, SUBSEQUENT ENCOUNTER: ICD-10-CM

## 2023-11-16 PROCEDURE — 3075F SYST BP GE 130 - 139MM HG: CPT | Performed by: NURSE PRACTITIONER

## 2023-11-16 PROCEDURE — 3079F DIAST BP 80-89 MM HG: CPT | Performed by: NURSE PRACTITIONER

## 2023-11-16 PROCEDURE — 99213 OFFICE O/P EST LOW 20 MIN: CPT | Performed by: NURSE PRACTITIONER

## 2023-11-16 RX ORDER — CYCLOBENZAPRINE HCL 5 MG
10 TABLET ORAL 3 TIMES DAILY PRN
Qty: 30 TABLET | Refills: 0 | Status: SHIPPED | OUTPATIENT
Start: 2023-11-16

## 2023-11-16 ASSESSMENT — FIBROSIS 4 INDEX: FIB4 SCORE: 0.55

## 2023-11-16 NOTE — PROGRESS NOTES
"Subjective:     Sabi Martinez is a 38 y.o. female who presents for Follow-Up ([facility] [employer] DOI 8/17/23 Rt & Lt shoulder, upper back, rm 16)      DOI: 8/17/23   TALIA: Repetitive lifting of boxes at work causing pain in bilateral shoulders/arms/neck. Today symptoms are worse as a result of not seeing the Care-Onsite. She states that once it stopped she is noticing more pain with moving her arms, sitting in a comfortable certain positions, cramping to the right trapezius/shoulder which triggers numbness, overhead movements, sharp pain radiates up to the neck, and intermittent comes and goes. She is taking OTC Tylenol 3 times per day, heat, stretching exercises as tolerated to work, and occasional icy-hot.  She is currently on full duty and does not want restrictions at this time.  Physical therapy and physiatry referral placed.  Plan of care discussed with patient.    ROS: All systems were reviewed on intake form, form was reviewed and signed. See scanned documents in media. Pertinent positives and negatives included in HPI.    PMH: No pertinent past medical history to this problem  MEDS: Medications were reviewed in Epic  ALLERGIES: No Known Allergies  SOCHX: Works as  at [employer] for the past 4 years  FH: No pertinent family history to this problem       Objective:     /84   Pulse 96   Resp 18   Ht 1.549 m (5' 1\")   Wt 102 kg (224 lb)   SpO2 96%   BMI 42.32 kg/m²     [unfilled]    Neck: FROM flex/extend/lateral bend/lateral rotation, with discomfort noted. No TTP of spinous processes, no step-offs appreciated.  Mild TTP along right paraspinal muscles and right superior trapezius. Neg Spurling's.  No JVD, cervical adenopathy, or cervical rigidity noted.  Distal neurovascular sensation intact.      Right shoulder: No gross deformity or discoloration noted.  Mild TTP inferior to lateral acromion  strength 5/5.  Full range of motion with increase in symptoms at 90 degrees.  Negative empty " can test, negative external rotation lag, negative cross body.  Negative speeds test.  Distal sensation equal and intact bilaterally. Pulses: radial, ulnar 2+    Thoracic back: No gross deformity or discoloration noted. Mild TTP to the thoracic paraspinal musculature, muscle spasms appreciate. No step-offs or deformities    Assessment/Plan:       1. Strain of neck muscle, subsequent encounter  - Referral to Physical Therapy  - Referral to Pain Clinic  - cyclobenzaprine (FLEXERIL) 5 mg tablet; Take 2 Tablets by mouth 3 times a day as needed for Muscle Spasms.  Dispense: 30 Tablet; Refill: 0    2. Strain of left shoulder, subsequent encounter  - Referral to Physical Therapy  - Referral to Pain Clinic  - cyclobenzaprine (FLEXERIL) 5 mg tablet; Take 2 Tablets by mouth 3 times a day as needed for Muscle Spasms.  Dispense: 30 Tablet; Refill: 0    3. Strain of right shoulder, subsequent encounter  - Referral to Physical Therapy  - Referral to Pain Clinic  - cyclobenzaprine (FLEXERIL) 5 mg tablet; Take 2 Tablets by mouth 3 times a day as needed for Muscle Spasms.  Dispense: 30 Tablet; Refill: 0    4. Thoracic myofascial strain, subsequent encounter  - Referral to Physical Therapy  - Referral to Pain Clinic  - cyclobenzaprine (FLEXERIL) 5 mg tablet; Take 2 Tablets by mouth 3 times a day as needed for Muscle Spasms.  Dispense: 30 Tablet; Refill: 0    5. Strain of trapezius muscle, unspecified laterality, subsequent encounter    Released to Full Duty FROM 11/16/2023 TO 11/30/2023     Follow-up in 2 weeks, unless seen by physiatry  Full duty, per physiatry  Physiatry referral placed, transfer care  Physical therapy referral placed  Recommend continue OTC Tylenol/ibuprofen, ice/heat application, and OTC topical ointment of your choosing  Take cyclobenzaprine as prescribed do not drive or work while taking the medication due to sedating effects  Follow-up with ED for new or worsening symptoms for evaluation management  symptoms    Differential diagnosis, natural history, supportive care, and indications for immediate follow-up discussed.    Approximately 25 minutes were spent in reviewing notes, preparing for visit, obtaining history, exam and evaluation, patient counseling/education and post visit documentation/orders.

## 2023-11-16 NOTE — LETTER
[address],   [address] [place] [address]  Phone:  [phone] - Fax:  [phone]   Occupational Health [facility] Progress Report and Disability Certification  Date of Service: 11/16/2023   No Show:  No  Date / Time of Next Visit: 11/30/2023 @ 11:00 AM   Claim Information   Patient Name: Sabi Martinez  Claim Number:     Employer: [employer]  Date of Injury: 8/17/2023     Insurer / TPA: [employer]  ID / SSN:     Occupation:   Diagnosis: Diagnoses of Strain of neck muscle, subsequent encounter, Strain of left shoulder, subsequent encounter, Strain of right shoulder, subsequent encounter, Thoracic myofascial strain, subsequent encounter, and Strain of trapezius muscle, unspecified laterality, subsequent encounter were pertinent to this visit.    Medical Information   Related to Industrial Injury? Yes    Subjective Complaints:  DOI: 8/17/23   TALIA: Repetitive lifting of boxes at work causing pain in bilateral shoulders/arms/neck. Today symptoms are worse as a result of not seeing the [facility]. She states that once it stopped she is noticing more pain with moving her arms, sitting in a comfortable certain positions, cramping to the right trapezius/shoulder which triggers numbness, overhead movements, sharp pain radiates up to the neck, and intermittent comes and goes. She is taking OTC Tylenol 3 times per day, heat, stretching exercises as tolerated to work, and occasional icy-hot.  She is currently on full duty and does not want restrictions at this time.  Physical therapy and physiatry referral placed.  Plan of care discussed with patient.   Objective Findings: Neck: FROM flex/extend/lateral bend/lateral rotation, with discomfort noted. No TTP of spinous processes, no step-offs appreciated.  Mild TTP along right paraspinal muscles and right superior trapezius. Neg Spurling's.  No JVD, cervical adenopathy, or cervical rigidity noted.   Distal neurovascular sensation intact.      Right shoulder: No gross deformity or discoloration noted.  Mild TTP inferior to lateral acromion  strength 5/5.  Full range of motion with increase in symptoms at 90 degrees.  Negative empty can test, negative external rotation lag, negative cross body.  Negative speeds test.  Distal sensation equal and intact bilaterally. Pulses: radial, ulnar 2+    Thoracic back: No gross deformity or discoloration noted. Mild TTP to the thoracic paraspinal musculature, muscle spasms appreciate. No step-offs or deformities   Pre-Existing Condition(s):     Assessment:   Condition Worsened    Status: Discharged / Care Transfer  Permanent Disability:No    Plan: PTTransfer CareMedication (NOT at Work)    Diagnostics:      Comments:  Follow-up in 2 weeks, unless seen by physiatry  Full duty, per physiatry  Physiatry referral placed, transfer care  Physical therapy referral placed  Recommend continue OTC Tylenol/ibuprofen, ice/heat application, and OTC topical ointment of your choosing  Take cyclobenzaprine as prescribed do not drive or work while taking the medication due to sedating effects  Follow-up with ED for new or worsening symptoms for evaluation management symptoms    Disability Information   Status: Released to Full Duty    From:  11/16/2023  Through: 11/30/2023 Restrictions are:     Physical Restrictions   Sitting:    Standing:    Stooping:    Bending:      Squatting:    Walking:    Climbing:    Pushing:      Pulling:    Other:    Reaching Above Shoulder (L):   Reaching Above Shoulder (R):       Reaching Below Shoulder (L):    Reaching Below Shoulder (R):      Not to exceed Weight Limits   Carrying(hrs):   Weight Limit(lb):   Lifting(hrs):   Weight  Limit(lb):     Comments:      Repetitive Actions   Hands: i.e. Fine Manipulations from Grasping:     Feet: i.e. Operating Foot Controls:     Driving / Operate Machinery:     Health Care Provider’s Original or Electronic  Signature  JOSR Phillip Health Care Provider’s Original or Electronic Signature    Miah Mcguire DO MPH     Clinic Name / Location: [address],   [facility] [address]  [place] [address] Clinic Phone Number: Dept: [phone]   Appointment Time: 1:15 Pm Visit Start Time: 1:07 PM   Check-In Time:  12:55 Pm Visit Discharge Time:  2:01 PM   Original-Treating Physician or Chiropractor    Page 2-Insurer/TPA    Page 3-Employer    Page 4-Employee

## 2023-12-15 ENCOUNTER — OCCUPATIONAL MEDICINE (OUTPATIENT)
Dept: OCCUPATIONAL MEDICINE | Facility: CLINIC | Age: 38
End: 2023-12-15
Payer: COMMERCIAL

## 2023-12-15 VITALS
HEIGHT: 61 IN | TEMPERATURE: 97.6 F | HEART RATE: 95 BPM | SYSTOLIC BLOOD PRESSURE: 130 MMHG | BODY MASS INDEX: 42.29 KG/M2 | RESPIRATION RATE: 12 BRPM | WEIGHT: 224 LBS | DIASTOLIC BLOOD PRESSURE: 90 MMHG | OXYGEN SATURATION: 98 %

## 2023-12-15 DIAGNOSIS — S16.1XXD STRAIN OF NECK MUSCLE, SUBSEQUENT ENCOUNTER: ICD-10-CM

## 2023-12-15 DIAGNOSIS — S46.911D STRAIN OF RIGHT SHOULDER, SUBSEQUENT ENCOUNTER: ICD-10-CM

## 2023-12-15 DIAGNOSIS — S46.912D STRAIN OF LEFT SHOULDER, SUBSEQUENT ENCOUNTER: ICD-10-CM

## 2023-12-15 DIAGNOSIS — S46.819D STRAIN OF TRAPEZIUS MUSCLE, UNSPECIFIED LATERALITY, SUBSEQUENT ENCOUNTER: ICD-10-CM

## 2023-12-15 DIAGNOSIS — S29.019D THORACIC MYOFASCIAL STRAIN, SUBSEQUENT ENCOUNTER: ICD-10-CM

## 2023-12-15 PROCEDURE — 3080F DIAST BP >= 90 MM HG: CPT | Performed by: NURSE PRACTITIONER

## 2023-12-15 PROCEDURE — 99213 OFFICE O/P EST LOW 20 MIN: CPT | Performed by: NURSE PRACTITIONER

## 2023-12-15 PROCEDURE — 3075F SYST BP GE 130 - 139MM HG: CPT | Performed by: NURSE PRACTITIONER

## 2023-12-15 RX ORDER — DICLOFENAC SODIUM 75 MG/1
75 TABLET, DELAYED RELEASE ORAL 2 TIMES DAILY
Qty: 60 TABLET | Refills: 0 | Status: SHIPPED | OUTPATIENT
Start: 2023-12-15

## 2023-12-15 RX ORDER — KETOROLAC TROMETHAMINE 30 MG/ML
60 INJECTION, SOLUTION INTRAMUSCULAR; INTRAVENOUS ONCE
Status: COMPLETED | OUTPATIENT
Start: 2023-12-15 | End: 2023-12-15

## 2023-12-15 RX ADMIN — KETOROLAC TROMETHAMINE 60 MG: 30 INJECTION, SOLUTION INTRAMUSCULAR; INTRAVENOUS at 11:39

## 2023-12-15 ASSESSMENT — ENCOUNTER SYMPTOMS
CARDIOVASCULAR NEGATIVE: 1
SENSORY CHANGE: 0
BACK PAIN: 1
RESPIRATORY NEGATIVE: 1
MYALGIAS: 1
CONSTITUTIONAL NEGATIVE: 1
PSYCHIATRIC NEGATIVE: 1
TINGLING: 0
NECK PAIN: 1
WEAKNESS: 0

## 2023-12-15 ASSESSMENT — FIBROSIS 4 INDEX: FIB4 SCORE: 0.55

## 2023-12-15 NOTE — LETTER
[address],   [address] [place] [address]  Phone:  [phone] - Fax:  [phone]   Occupational Health [facility] Progress Report and Disability Certification  Date of Service: 12/15/2023   No Show:  No  Date / Time of Next Visit: 12/29/2023 @ 10:45AM   Claim Information   Patient Name: Sabi Martinez  Claim Number:     Employer: [employer]  Date of Injury: 8/17/2023     Insurer / TPA: [employer]  ID / SSN:     Occupation:   Diagnosis: Diagnoses of Strain of neck muscle, subsequent encounter, Strain of left shoulder, subsequent encounter, Strain of right shoulder, subsequent encounter, Thoracic myofascial strain, subsequent encounter, and Strain of trapezius muscle, unspecified laterality, subsequent encounter were pertinent to this visit.    Medical Information   Related to Industrial Injury? Yes    Subjective Complaints:  DOI: 8/17/23 TALIA: Repetitive lifting of boxes at work causing pain in bilateral shoulders/arms/neck. Today symptoms are worse and unchanged since last visit. She states that sometimes her back will lock up and she finds it difficult to twist back-and-forth, continued pain with moving her arms, sitting in a comfortable certain positions, cramping to the right trapezius/shoulder which triggers numbness, overhead movements, sharp pain radiates up to the neck, and intermittent comes and goes. She is taking OTC Tylenol 3 times per day, heat, stretching exercises as tolerated to work, and occasional icy-hot.  She states she took the muscle relaxer and slept for 14 hours but also was sick and did take cough medicine at the same time.  Educated on avoiding mixing medications due to potentially sedating effects.  She verbalized her understanding.  She is currently on full duty but has had an incident where her back is locked up.  She will be placed on light duty at this time.  Toradol injection provided at this visit.   Physical therapy beginning January 3.  Physiatry pending scheduling.  Plan of care discussed with patient.     Objective Findings: Neck: FROM flex/extend/lateral bend/lateral rotation, with discomfort noted. No TTP of spinous processes, no step-offs appreciated.  Mild TTP along right paraspinal muscles and right superior trapezius. Neg Spurling's.  No JVD, cervical adenopathy, or cervical rigidity noted.  Distal neurovascular sensation intact.      Right shoulder: No gross deformity or discoloration noted.  Mild TTP inferior to lateral acromion  strength 5/5.  Full range of motion with increase in symptoms at 90 degrees.  Negative empty can test, negative external rotation lag, negative cross body.  Negative speeds test.  Distal sensation equal and intact bilaterally. Pulses: radial, ulnar 2+     Thoracic back: No gross deformity or discoloration noted. Mild TTP to the thoracic paraspinal musculature, muscle spasms appreciate. No step-offs or deformities        Pre-Existing Condition(s):     Assessment:   Condition Same    Status: Discharged / Care Transfer  Permanent Disability:No    Plan: PTMedicationMedication (NOT at Work)Transfer Care  Comments:Toradol IM    Diagnostics:      Comments:  Follow-up in 2 weeks, unless seen by physiatry  Restricted duty, per physiatry  Physiatry approved pending scheduling, transfer care  Physical therapy referral placed  Toradol injection at this visit  Take diclofenac as prescribed if ineffective okay to return to OTC Tylenol/ibuprofen  Recommend continue OTC Tylenol/ibuprofen, ice/heat application, and OTC topical ointment of your choosing  Take cyclobenzaprine as prescribed do not drive or work while taking the medication due to sedating effects  Follow-up with ED for new or worsening symptoms for evaluation management symptoms      Disability Information   Status: Released to Restricted Duty    From:  12/15/2023  Through: 12/29/2023 Restrictions are: Temporary   Physical  Restrictions   Sitting:    Standing:    Stooping:    Bending:      Squatting:    Walking:    Climbing:    Pushing:      Pulling:    Other:    Reaching Above Shoulder (L):   Reaching Above Shoulder (R):       Reaching Below Shoulder (L):    Reaching Below Shoulder (R):      Not to exceed Weight Limits   Carrying(hrs):   Weight Limit(lb): < or = to 25 pounds Lifting(hrs):   Weight  Limit(lb): < or = to 25 pounds   Comments: Avoid pushing, pulling, or lifting more than 25lbs    Repetitive Actions   Hands: i.e. Fine Manipulations from Grasping:     Feet: i.e. Operating Foot Controls:     Driving / Operate Machinery:     Health Care Provider’s Original or Electronic Signature  JOSR Phillip Health Care Provider’s Original or Electronic Signature    Miah Mcguire DO MPH     Clinic Name / Location: [address],   [address]  [place] [address] Clinic Phone Number: Dept: [phone]   Appointment Time: 10:45 Am Visit Start Time: 11:05 AM   Check-In Time:  10:50 Am Visit Discharge Time:  11:41AM   Original-Treating Physician or Chiropractor    Page 2-Insurer/TPA    Page 3-Employer    Page 4-Employee

## 2023-12-15 NOTE — PROGRESS NOTES
"Subjective:     Sabi Martinez is a 38 y.o. female who presents for Follow-Up ( - Follow Up - Rm 16)      DOI: 8/17/23 TALIA: Repetitive lifting of boxes at work causing pain in bilateral shoulders/arms/neck. Today symptoms are worse and unchanged since last visit. She states that sometimes her back will lock up and she finds it difficult to twist back-and-forth, continued pain with moving her arms, sitting in a comfortable certain positions, cramping to the right trapezius/shoulder which triggers numbness, overhead movements, sharp pain radiates up to the neck, and intermittent comes and goes. She is taking OTC Tylenol 3 times per day, heat, stretching exercises as tolerated to work, and occasional icy-hot.  She states she took the muscle relaxer and slept for 14 hours but also was sick and did take cough medicine at the same time.  Educated on avoiding mixing medications due to potentially sedating effects.  She verbalized her understanding.  She is currently on full duty but has had an incident where her back is locked up.  She will be placed on light duty at this time.  Toradol injection provided at this visit.  Physical therapy beginning January 3.  Physiatry pending scheduling.  Plan of care discussed with patient.      Review of Systems   Constitutional: Negative.    Respiratory: Negative.     Cardiovascular: Negative.    Musculoskeletal:  Positive for back pain, myalgias and neck pain.   Skin: Negative.    Neurological:  Negative for tingling, sensory change and weakness.   Psychiatric/Behavioral: Negative.         SOCHX: Works as a  at [employer] for the past 4 years  FH: No pertinent family history to this problem.       Objective:     BP (!) 130/90 (Patient Position: Sitting, BP Cuff Size: Adult)   Pulse 95   Temp 36.4 °C (97.6 °F)   Resp 12   Ht 1.549 m (5' 1\")   Wt 102 kg (224 lb)   SpO2 98%   BMI 42.32 kg/m²     Constitutional: Patient is in no acute distress. Appears " well-developed and well-nourished.   Cardiovascular: Normal rate.    Pulmonary/Chest: Effort normal. No respiratory distress.   Neurological: Patient is alert and oriented to person, place, and time.   Skin: Skin is warm and dry.   Psychiatric: Normal mood and affect. Behavior is normal.     Neck: FROM flex/extend/lateral bend/lateral rotation, with discomfort noted. No TTP of spinous processes, no step-offs appreciated.  Mild TTP along right paraspinal muscles and right superior trapezius. Neg Spurling's.  No JVD, cervical adenopathy, or cervical rigidity noted.  Distal neurovascular sensation intact.      Right shoulder: No gross deformity or discoloration noted.  Mild TTP inferior to lateral acromion  strength 5/5.  Full range of motion with increase in symptoms at 90 degrees.  Negative empty can test, negative external rotation lag, negative cross body.  Negative speeds test.  Distal sensation equal and intact bilaterally. Pulses: radial, ulnar 2+     Thoracic back: No gross deformity or discoloration noted. Mild TTP to the thoracic paraspinal musculature, muscle spasms appreciate. No step-offs or deformities         Assessment/Plan:       1. Strain of neck muscle, subsequent encounter  - diclofenac DR (VOLTAREN) 75 MG Tablet Delayed Response; Take 1 Tablet by mouth 2 times a day.  Dispense: 60 Tablet; Refill: 0  - ketorolac (Toradol) injection 60 mg    2. Strain of left shoulder, subsequent encounter  - diclofenac DR (VOLTAREN) 75 MG Tablet Delayed Response; Take 1 Tablet by mouth 2 times a day.  Dispense: 60 Tablet; Refill: 0  - ketorolac (Toradol) injection 60 mg    3. Strain of right shoulder, subsequent encounter  - diclofenac DR (VOLTAREN) 75 MG Tablet Delayed Response; Take 1 Tablet by mouth 2 times a day.  Dispense: 60 Tablet; Refill: 0  - ketorolac (Toradol) injection 60 mg    4. Thoracic myofascial strain, subsequent encounter  - diclofenac DR (VOLTAREN) 75 MG Tablet Delayed Response; Take 1 Tablet  by mouth 2 times a day.  Dispense: 60 Tablet; Refill: 0  - ketorolac (Toradol) injection 60 mg    5. Strain of trapezius muscle, unspecified laterality, subsequent encounter  - diclofenac DR (VOLTAREN) 75 MG Tablet Delayed Response; Take 1 Tablet by mouth 2 times a day.  Dispense: 60 Tablet; Refill: 0  - ketorolac (Toradol) injection 60 mg    Released to Restricted Duty FROM 12/15/2023 TO 12/29/2023  Avoid pushing, pulling, or lifting more than 25lbs    Follow-up in 2 weeks, unless seen by physiatry  Restricted duty, per physiatry  Physiatry approved pending scheduling, transfer care  Physical therapy referral placed  Toradol injection at this visit  Take diclofenac as prescribed if ineffective okay to return to OTC Tylenol/ibuprofen  Recommend continue OTC Tylenol/ibuprofen, ice/heat application, and OTC topical ointment of your choosing  Take cyclobenzaprine as prescribed do not drive or work while taking the medication due to sedating effects  Follow-up with ED for new or worsening symptoms for evaluation management symptoms      Differential diagnosis, natural history, supportive care, and indications for immediate follow-up discussed.    Approximately 25 minutes was spent in preparing for visit, obtaining history, exam and evaluation, patient counseling/education and post visit documentation/orders.

## 2023-12-29 ENCOUNTER — OCCUPATIONAL MEDICINE (OUTPATIENT)
Dept: OCCUPATIONAL MEDICINE | Facility: CLINIC | Age: 38
End: 2023-12-29
Payer: COMMERCIAL

## 2023-12-29 VITALS
DIASTOLIC BLOOD PRESSURE: 82 MMHG | HEART RATE: 96 BPM | SYSTOLIC BLOOD PRESSURE: 136 MMHG | RESPIRATION RATE: 18 BRPM | WEIGHT: 224 LBS | HEIGHT: 61 IN | BODY MASS INDEX: 42.29 KG/M2

## 2023-12-29 DIAGNOSIS — S46.911D STRAIN OF RIGHT SHOULDER, SUBSEQUENT ENCOUNTER: ICD-10-CM

## 2023-12-29 DIAGNOSIS — S46.912D STRAIN OF LEFT SHOULDER, SUBSEQUENT ENCOUNTER: ICD-10-CM

## 2023-12-29 DIAGNOSIS — S29.019D THORACIC MYOFASCIAL STRAIN, SUBSEQUENT ENCOUNTER: ICD-10-CM

## 2023-12-29 DIAGNOSIS — S16.1XXD STRAIN OF NECK MUSCLE, SUBSEQUENT ENCOUNTER: ICD-10-CM

## 2023-12-29 DIAGNOSIS — S46.819D STRAIN OF TRAPEZIUS MUSCLE, UNSPECIFIED LATERALITY, SUBSEQUENT ENCOUNTER: ICD-10-CM

## 2023-12-29 PROCEDURE — 99213 OFFICE O/P EST LOW 20 MIN: CPT | Performed by: NURSE PRACTITIONER

## 2023-12-29 PROCEDURE — 3079F DIAST BP 80-89 MM HG: CPT | Performed by: NURSE PRACTITIONER

## 2023-12-29 PROCEDURE — 3075F SYST BP GE 130 - 139MM HG: CPT | Performed by: NURSE PRACTITIONER

## 2023-12-29 ASSESSMENT — ENCOUNTER SYMPTOMS
PSYCHIATRIC NEGATIVE: 1
CONSTITUTIONAL NEGATIVE: 1
BACK PAIN: 1
NECK PAIN: 1
SENSORY CHANGE: 1
MYALGIAS: 1
TINGLING: 1
WEAKNESS: 1
CARDIOVASCULAR NEGATIVE: 1
RESPIRATORY NEGATIVE: 1

## 2023-12-29 ASSESSMENT — FIBROSIS 4 INDEX: FIB4 SCORE: 0.55

## 2023-12-29 NOTE — PROGRESS NOTES
"Subjective:     aSbi Martinez is a 38 y.o. female who presents for Follow-Up (WC DOI 8/17/23 both shoulders/upper back, rm 16)      DOI: 8/17/23 TALIA: Repetitive lifting of boxes at work causing pain in bilateral shoulders/arms/neck. Today symptoms are slightly improved since last visit, but is starting to see them returned slowly.  She states that the Toradol injection was very helpful.  Also reports she had a few days off because of the holiday and that was also beneficial.  Continue to not be able to tolerate the muscle relaxer it makes her sleep too long.  She is taking the diclofenac maybe once since last visit.   She continues to stretch, apply heat, and use occasional IcyHot if needed for symptom relief.  She is currently tolerating light duty with minimal difficulty. Physical therapy as scheduled.  Physiatry appointment is scheduled for 1/11/2024.  Plan of care discussed with patient.    Review of Systems   Constitutional: Negative.    Respiratory: Negative.     Cardiovascular: Negative.    Musculoskeletal:  Positive for back pain, myalgias and neck pain.   Skin: Negative.    Neurological:  Positive for tingling, sensory change and weakness.   Psychiatric/Behavioral: Negative.         SOCHX: Works as  at [employer] for the past 4 years  FH: No pertinent family history to this problem       Objective:     /82   Pulse 96   Resp 18   Ht 1.549 m (5' 1\")   Wt 102 kg (224 lb)   BMI 42.32 kg/m²     Constitutional: Patient is in no acute distress. Appears well-developed and well-nourished.   Cardiovascular: Normal rate.    Pulmonary/Chest: Effort normal. No respiratory distress.   Neurological: Patient is alert and oriented to person, place, and time.   Skin: Skin is warm and dry.   Psychiatric: Normal mood and affect. Behavior is normal.     neck: FROM flex/extend/lateral bend/lateral rotation, with discomfort noted. No TTP of spinous processes, no step-offs appreciated.  Mild TTP " along right paraspinal muscles and right superior trapezius. Neg Spurling's.  No JVD, cervical adenopathy, or cervical rigidity noted.  Distal neurovascular sensation intact.      Right shoulder: No gross deformity or discoloration noted.  Mild TTP inferior to lateral acromion  strength 5/5.  Full range of motion with increase in symptoms at 90 degrees.  Negative empty can test, negative external rotation lag, negative cross body.  Negative speeds test.  Distal sensation equal and intact bilaterally. Pulses: radial, ulnar 2+     Thoracic back: No gross deformity or discoloration noted. Mild TTP to the thoracic paraspinal musculature, muscle spasms appreciate. No step-offs or deformities      Assessment/Plan:       1. Strain of neck muscle, subsequent encounter    2. Strain of left shoulder, subsequent encounter    3. Strain of right shoulder, subsequent encounter    4. Thoracic myofascial strain, subsequent encounter    5. Strain of trapezius muscle, unspecified laterality, subsequent encounter    Released to Restricted Duty FROM 12/29/2023 TO    Avoid pushing, pulling, or lifting more than 25lbs    Follow-up in 4 weeks, unless seen by physiatry  Restricted duty, per physiatry  Physiatry approved, transfer care 1/11/24  Physical therapy appointments as scheduled  Take diclofenac as prescribed if ineffective okay to return to OTC Tylenol/ibuprofen  Recommend continue OTC Tylenol/ibuprofen, ice/heat application, and OTC topical ointment of your choosing  Take cyclobenzaprine as prescribed do not drive or work while taking the medication due to sedating effects  Follow-up with ED for new or worsening symptoms for evaluation management symptoms         Differential diagnosis, natural history, supportive care, and indications for immediate follow-up discussed.    Approximately 25 minutes was spent in preparing for visit, obtaining history, exam and evaluation, patient counseling/education and post visit  documentation/orders.

## 2023-12-29 NOTE — LETTER
[address],   [address] [place] [address]  Phone:  [phone] - Fax:  [phone]   Occupational Health [facility] Progress Report and Disability Certification  Date of Service: 12/29/2023   No Show:  No  Date / Time of Next Visit:  Discharge/care transfer to physiatry 1/11/2024   Claim Information   Patient Name: Sabi Martinez  Claim Number:     Employer: [employer]  Date of Injury: 8/17/2023     Insurer / TPA: [employer]  ID / SSN:     Occupation:   Diagnosis: Diagnoses of Strain of neck muscle, subsequent encounter, Strain of left shoulder, subsequent encounter, Strain of right shoulder, subsequent encounter, Thoracic myofascial strain, subsequent encounter, and Strain of trapezius muscle, unspecified laterality, subsequent encounter were pertinent to this visit.    Medical Information   Related to Industrial Injury? Yes    Subjective Complaints:  DOI: 8/17/23 TALIA: Repetitive lifting of boxes at work causing pain in bilateral shoulders/arms/neck. Today symptoms are slightly improved since last visit, but is starting to see them returned slowly.  She states that the Toradol injection was very helpful.  Also reports she had a few days off because of the holiday and that was also beneficial.  Continue to not be able to tolerate the muscle relaxer it makes her sleep too long.  She is taking the diclofenac maybe once since last visit.   She continues to stretch, apply heat, and use occasional IcyHot if needed for symptom relief.  She is currently tolerating light duty with minimal difficulty. Physical therapy as scheduled.  Physiatry appointment is scheduled for 1/11/2024.  Plan of care discussed with patient.   Objective Findings: neck: FROM flex/extend/lateral bend/lateral rotation, with discomfort noted. No TTP of spinous processes, no step-offs appreciated.  Mild TTP along right paraspinal muscles and right superior trapezius.  Neg Spurling's.  No JVD, cervical adenopathy, or cervical rigidity noted.  Distal neurovascular sensation intact.      Right shoulder: No gross deformity or discoloration noted.  Mild TTP inferior to lateral acromion  strength 5/5.  Full range of motion with increase in symptoms at 90 degrees.  Negative empty can test, negative external rotation lag, negative cross body.  Negative speeds test.  Distal sensation equal and intact bilaterally. Pulses: radial, ulnar 2+     Thoracic back: No gross deformity or discoloration noted. Mild TTP to the thoracic paraspinal musculature, muscle spasms appreciate. No step-offs or deformities     Pre-Existing Condition(s):     Assessment:   Condition Same    Status: Discharged / Care Transfer  Permanent Disability:No    Plan: PTMedication (NOT at Work)MedicationTransfer Care    Diagnostics:      Comments:  Follow-up in 4 weeks, unless seen by physiatry  Restricted duty, per physiatry  Physiatry approved, transfer care 1/11/24  Physical therapy appointments as scheduled  Take diclofenac as prescribed if ineffective okay to return to OTC Tylenol/ibuprofen  Recommend continue OTC Tylenol/ibuprofen, ice/heat application, and OTC topical ointment of your choosing  Take cyclobenzaprine as prescribed do not drive or work while taking the medication due to sedating effects  Follow-up with ED for new or worsening symptoms for evaluation management symptoms         Disability Information   Status: Released to Restricted Duty    From:  12/29/2023  Through:   Restrictions are: Temporary   Physical Restrictions   Sitting:    Standing:    Stooping:    Bending:      Squatting:    Walking:    Climbing:    Pushing:      Pulling:    Other:    Reaching Above Shoulder (L):   Reaching Above Shoulder (R):       Reaching Below Shoulder (L):    Reaching Below Shoulder (R):      Not to exceed Weight Limits   Carrying(hrs):   Weight Limit(lb): < or = to 25 pounds Lifting(hrs):   Weight  Limit(lb): < or  = to 25 pounds   Comments: Avoid pushing, pulling, or lifting more than 25lbs    Repetitive Actions   Hands: i.e. Fine Manipulations from Grasping:     Feet: i.e. Operating Foot Controls:     Driving / Operate Machinery:     Health Care Provider’s Original or Electronic Signature  JOSR Phillip Health Care Provider’s Original or Electronic Signature    Miah Mcguire DO MPH     Clinic Name / Location: [address],   [address]  [place] [address] Clinic Phone Number: Dept: [phone]   Appointment Time: 10:45 Am Visit Start Time: 10:33 AM   Check-In Time:  10:14 Am Visit Discharge Time:  10:42 AM    Original-Treating Physician or Chiropractor    Page 2-Insurer/TPA    Page 3-Employer    Page 4-Employee

## 2024-06-10 ENCOUNTER — DOCUMENTATION (OUTPATIENT)
Dept: HEALTH INFORMATION MANAGEMENT | Facility: OTHER | Age: 39
End: 2024-06-10
Payer: COMMERCIAL

## 2024-07-17 ENCOUNTER — TELEPHONE (OUTPATIENT)
Dept: HEALTH INFORMATION MANAGEMENT | Facility: OTHER | Age: 39
End: 2024-07-17
Payer: COMMERCIAL

## 2024-12-26 ENCOUNTER — OFFICE VISIT (OUTPATIENT)
Dept: URGENT CARE | Facility: PHYSICIAN GROUP | Age: 39
End: 2024-12-26
Payer: COMMERCIAL

## 2024-12-26 VITALS
BODY MASS INDEX: 43.23 KG/M2 | OXYGEN SATURATION: 96 % | WEIGHT: 229 LBS | HEART RATE: 97 BPM | RESPIRATION RATE: 14 BRPM | DIASTOLIC BLOOD PRESSURE: 86 MMHG | TEMPERATURE: 96.9 F | SYSTOLIC BLOOD PRESSURE: 138 MMHG | HEIGHT: 61 IN

## 2024-12-26 DIAGNOSIS — B34.9 VIRAL ILLNESS: Primary | ICD-10-CM

## 2024-12-26 DIAGNOSIS — R21 RASH OF UNKNOWN ETIOLOGY: ICD-10-CM

## 2024-12-26 DIAGNOSIS — R68.89 FLU-LIKE SYMPTOMS: ICD-10-CM

## 2024-12-26 LAB
APPEARANCE UR: CLEAR
BILIRUB UR STRIP-MCNC: NORMAL MG/DL
COLOR UR AUTO: YELLOW
GLUCOSE UR STRIP.AUTO-MCNC: NORMAL MG/DL
KETONES UR STRIP.AUTO-MCNC: NORMAL MG/DL
LEUKOCYTE ESTERASE UR QL STRIP.AUTO: NORMAL
NITRITE UR QL STRIP.AUTO: NORMAL
PH UR STRIP.AUTO: 7 [PH] (ref 5–8)
PROT UR QL STRIP: NORMAL MG/DL
RBC UR QL AUTO: NORMAL
S PYO DNA SPEC NAA+PROBE: NOT DETECTED
SP GR UR STRIP.AUTO: 1.02
UROBILINOGEN UR STRIP-MCNC: 0.2 MG/DL

## 2024-12-26 PROCEDURE — 3075F SYST BP GE 130 - 139MM HG: CPT | Performed by: NURSE PRACTITIONER

## 2024-12-26 PROCEDURE — 99213 OFFICE O/P EST LOW 20 MIN: CPT | Performed by: NURSE PRACTITIONER

## 2024-12-26 PROCEDURE — 81002 URINALYSIS NONAUTO W/O SCOPE: CPT | Performed by: NURSE PRACTITIONER

## 2024-12-26 PROCEDURE — 3079F DIAST BP 80-89 MM HG: CPT | Performed by: NURSE PRACTITIONER

## 2024-12-26 PROCEDURE — 87651 STREP A DNA AMP PROBE: CPT | Performed by: NURSE PRACTITIONER

## 2024-12-26 RX ORDER — IBUPROFEN 200 MG
200 TABLET ORAL EVERY 6 HOURS PRN
COMMUNITY

## 2024-12-26 ASSESSMENT — ENCOUNTER SYMPTOMS
MYALGIAS: 1
VOMITING: 0
NAUSEA: 0
DIZZINESS: 0
SHORTNESS OF BREATH: 0
COUGH: 0
SORE THROAT: 0
DIARRHEA: 0
FEVER: 0
HEADACHES: 1
SINUS PAIN: 0

## 2024-12-26 ASSESSMENT — FIBROSIS 4 INDEX: FIB4 SCORE: 0.56

## 2024-12-26 NOTE — PROGRESS NOTES
Subjective:     Sabi Martinez is a 39 y.o. female who presents for Body Aches (X 1 week off/on) and Rash (Lower limbs)      Rash  This is a new problem. The current episode started in the past 7 days (Sabi is  a pleasant 39 year old female who presents to  today with complaints of joint pain, fatigue, decreased appetite and rash). The problem has been gradually improving (rash has improved and is now only on left ankle. Not itchy or painful) since onset. Associated symptoms include joint pain. Pertinent negatives include no congestion, cough, diarrhea, fever, shortness of breath, sore throat or vomiting. Treatments tried: dayquil,Ibuprofen, rest. The treatment provided no relief.   Patient states that her sons were ill with similar symptoms.  She has also been exposed to roseola at her workplace.      Review of Systems   Constitutional:  Positive for malaise/fatigue. Negative for fever.   HENT:  Negative for congestion, ear pain, sinus pain and sore throat.    Respiratory:  Negative for cough and shortness of breath.    Cardiovascular:  Negative for chest pain.   Gastrointestinal:  Negative for diarrhea, nausea and vomiting.   Musculoskeletal:  Positive for joint pain and myalgias.   Skin:  Positive for rash.   Neurological:  Positive for headaches. Negative for dizziness.       PMH:   Past Medical History:   Diagnosis Date    Anxiety     Arthritis     Depression     Migraine      ALLERGIES: No Known Allergies  SURGHX:   Past Surgical History:   Procedure Laterality Date    ANKLE ARTHROSCOPY Right     DENTAL EXTRACTION(S)      KNEE ARTHROSCOPY Right     PRIMARY C SECTION       SOCHX:   Social History     Socioeconomic History    Marital status: Unknown    Highest education level: 12th grade   Tobacco Use    Smoking status: Every Day     Current packs/day: 0.13     Average packs/day: 0.1 packs/day for 13.0 years (1.7 ttl pk-yrs)     Types: Cigarettes    Smokeless tobacco: Never   Vaping Use    Vaping  status: Never Used   Substance and Sexual Activity    Alcohol use: Never    Drug use: Never    Sexual activity: Not Currently     Partners: Male     Social Drivers of Health     Financial Resource Strain: Unknown (12/12/2022)    Overall Financial Resource Strain (CARDIA)     Difficulty of Paying Living Expenses: Patient declined   Food Insecurity: No Food Insecurity (12/12/2022)    Hunger Vital Sign     Worried About Running Out of Food in the Last Year: Never true     Ran Out of Food in the Last Year: Never true   Transportation Needs: No Transportation Needs (12/12/2022)    PRAPARE - Transportation     Lack of Transportation (Medical): No     Lack of Transportation (Non-Medical): No   Physical Activity: Sufficiently Active (12/12/2022)    Exercise Vital Sign     Days of Exercise per Week: 4 days     Minutes of Exercise per Session: 150+ min   Stress: Stress Concern Present (12/12/2022)    [employer] - Occupational Stress Questionnaire     Feeling of Stress : Rather much   Social Connections: Socially Isolated (12/12/2022)    Social Connection and Isolation Panel [NHANES]     Frequency of Communication with Friends and Family: Once a week     Frequency of Social Gatherings with Friends and Family: Never     Attends Sabianist Services: Never     Active Member of Clubs or Organizations: No     Attends Club or Organization Meetings: Never     Marital Status: Never    Housing Stability: High Risk (12/12/2022)    Housing Stability Vital Sign     Unable to Pay for Housing in the Last Year: Yes     Number of Places Lived in the Last Year: 1     Unstable Housing in the Last Year: No     FH:   Family History   Problem Relation Age of Onset    Drug abuse Mother     ADD / ADHD Mother     Bipolar disorder Mother     Anxiety disorder Mother     Depression Mother     Alcohol abuse Mother     Cancer Father     Drug abuse Father     Alcohol abuse Father     Cancer Maternal Aunt         stomach   "   No Known Problems Maternal Grandmother     No Known Problems Maternal Grandfather     No Known Problems Paternal Grandmother     Cancer Paternal Grandfather         colon         Objective:   /86   Pulse 97   Temp 36.1 °C (96.9 °F) (Temporal)   Resp 14   Ht 1.549 m (5' 1\")   Wt 104 kg (229 lb)   SpO2 96%   BMI 43.27 kg/m²     Physical Exam  Vitals and nursing note reviewed.   Constitutional:       General: She is not in acute distress.     Appearance: Normal appearance. She is normal weight. She is ill-appearing. She is not toxic-appearing.   HENT:      Head: Normocephalic.      Right Ear: Tympanic membrane, ear canal and external ear normal.      Left Ear: Tympanic membrane, ear canal and external ear normal.      Nose: No congestion or rhinorrhea.      Mouth/Throat:      Mouth: Mucous membranes are moist.      Pharynx: No oropharyngeal exudate or posterior oropharyngeal erythema.   Eyes:      General:         Right eye: No discharge.         Left eye: No discharge.      Pupils: Pupils are equal, round, and reactive to light.   Cardiovascular:      Rate and Rhythm: Normal rate and regular rhythm.      Pulses: Normal pulses.      Heart sounds: Normal heart sounds.   Pulmonary:      Effort: Pulmonary effort is normal. No respiratory distress.      Breath sounds: No stridor. No wheezing, rhonchi or rales.   Chest:      Chest wall: No tenderness.   Abdominal:      General: Abdomen is flat.   Musculoskeletal:         General: Normal range of motion.      Cervical back: Normal range of motion and neck supple.   Skin:     General: Skin is dry.      Comments: Pinpoint erythemic macular rash present to left ankle.  Rash seems to be dissipating.   Neurological:      General: No focal deficit present.      Mental Status: She is alert and oriented to person, place, and time. Mental status is at baseline.   Psychiatric:         Mood and Affect: Mood normal.         Behavior: Behavior normal.         Thought " Content: Thought content normal.         Judgment: Judgment normal.       Results for orders placed or performed in visit on 12/26/24   POCT Urinalysis    Collection Time: 12/26/24 10:01 AM   Result Value Ref Range    POC Color yellow Negative    POC Appearance clear Negative    POC Glucose neg Negative mg/dL    POC Bilirubin neg Negative mg/dL    POC Ketones neg Negative mg/dL    POC Specific Gravity 1.020 <1.005 - >1.030    POC Blood neg Negative    POC Urine PH 7.0 5.0 - 8.0    POC Protein neg Negative mg/dL    POC Urobiligen 0.2 Negative (0.2) mg/dL    POC Nitrites neg Negative    POC Leukocyte Esterase neg Negative       Assessment/Plan:   Assessment    1. Viral illness        2. Flu-like symptoms  POCT CEPHEID GROUP A STREP - PCR    POCT Urinalysis    CANCELED: URINALYSIS,CULTURE IF INDICATED      3. Rash of unknown etiology  POCT CEPHEID GROUP A STREP - PCR    CANCELED: URINALYSIS,CULTURE IF INDICATED        Patient's flulike/viral symptoms are progressively improving.  Unfortunately, I am unable to test for roseola however, given her rash and viral illness I do think this is a likely possibility of infection.  We did discuss rest, increase fluids and Motrin/Tylenol as needed for further relief of bodyaches.  Viral testing was not performed in clinic today as she is out of the timeframe for COVID treatment and Tamiflu.  Patient to return for worsening or persistent symptoms.  She is in agreement with plan of care.

## 2025-08-18 SDOH — HEALTH STABILITY: PHYSICAL HEALTH: ON AVERAGE, HOW MANY DAYS PER WEEK DO YOU ENGAGE IN MODERATE TO STRENUOUS EXERCISE (LIKE A BRISK WALK)?: 3 DAYS

## 2025-08-18 SDOH — ECONOMIC STABILITY: INCOME INSECURITY: IN THE LAST 12 MONTHS, WAS THERE A TIME WHEN YOU WERE NOT ABLE TO PAY THE MORTGAGE OR RENT ON TIME?: NO

## 2025-08-18 SDOH — ECONOMIC STABILITY: FOOD INSECURITY: WITHIN THE PAST 12 MONTHS, YOU WORRIED THAT YOUR FOOD WOULD RUN OUT BEFORE YOU GOT MONEY TO BUY MORE.: NEVER TRUE

## 2025-08-18 SDOH — HEALTH STABILITY: PHYSICAL HEALTH: ON AVERAGE, HOW MANY MINUTES DO YOU ENGAGE IN EXERCISE AT THIS LEVEL?: PATIENT DECLINED

## 2025-08-18 SDOH — ECONOMIC STABILITY: FOOD INSECURITY: WITHIN THE PAST 12 MONTHS, THE FOOD YOU BOUGHT JUST DIDN'T LAST AND YOU DIDN'T HAVE MONEY TO GET MORE.: NEVER TRUE

## 2025-08-18 SDOH — HEALTH STABILITY: MENTAL HEALTH
STRESS IS WHEN SOMEONE FEELS TENSE, NERVOUS, ANXIOUS, OR CAN'T SLEEP AT NIGHT BECAUSE THEIR MIND IS TROUBLED. HOW STRESSED ARE YOU?: VERY MUCH

## 2025-08-18 SDOH — ECONOMIC STABILITY: INCOME INSECURITY: HOW HARD IS IT FOR YOU TO PAY FOR THE VERY BASICS LIKE FOOD, HOUSING, MEDICAL CARE, AND HEATING?: NOT HARD AT ALL

## 2025-08-18 ASSESSMENT — SOCIAL DETERMINANTS OF HEALTH (SDOH)
IN THE PAST 12 MONTHS, HAS THE ELECTRIC, GAS, OIL, OR WATER COMPANY THREATENED TO SHUT OFF SERVICE IN YOUR HOME?: NO
DO YOU BELONG TO ANY CLUBS OR ORGANIZATIONS SUCH AS CHURCH GROUPS UNIONS, FRATERNAL OR ATHLETIC GROUPS, OR SCHOOL GROUPS?: NO
HOW OFTEN DO YOU HAVE A DRINK CONTAINING ALCOHOL: NEVER
DO YOU BELONG TO ANY CLUBS OR ORGANIZATIONS SUCH AS CHURCH GROUPS UNIONS, FRATERNAL OR ATHLETIC GROUPS, OR SCHOOL GROUPS?: NO
HOW OFTEN DO YOU HAVE SIX OR MORE DRINKS ON ONE OCCASION: NEVER
IN A TYPICAL WEEK, HOW MANY TIMES DO YOU TALK ON THE PHONE WITH FAMILY, FRIENDS, OR NEIGHBORS?: MORE THAN THREE TIMES A WEEK
HOW OFTEN DO YOU ATTEND CHURCH OR RELIGIOUS SERVICES?: NEVER
HOW MANY DRINKS CONTAINING ALCOHOL DO YOU HAVE ON A TYPICAL DAY WHEN YOU ARE DRINKING: 1 OR 2
HOW OFTEN DO YOU GET TOGETHER WITH FRIENDS OR RELATIVES?: NEVER
HOW HARD IS IT FOR YOU TO PAY FOR THE VERY BASICS LIKE FOOD, HOUSING, MEDICAL CARE, AND HEATING?: NOT HARD AT ALL
HOW OFTEN DO YOU ATTEND CHURCH OR RELIGIOUS SERVICES?: NEVER
ARE YOU MARRIED, WIDOWED, DIVORCED, SEPARATED, NEVER MARRIED, OR LIVING WITH A PARTNER?: NEVER MARRIED
HOW OFTEN DO YOU GET TOGETHER WITH FRIENDS OR RELATIVES?: NEVER
WITHIN THE PAST 12 MONTHS, YOU WORRIED THAT YOUR FOOD WOULD RUN OUT BEFORE YOU GOT THE MONEY TO BUY MORE: NEVER TRUE
IN A TYPICAL WEEK, HOW MANY TIMES DO YOU TALK ON THE PHONE WITH FAMILY, FRIENDS, OR NEIGHBORS?: MORE THAN THREE TIMES A WEEK
HOW OFTEN DO YOU ATTENT MEETINGS OF THE CLUB OR ORGANIZATION YOU BELONG TO?: NEVER
HOW OFTEN DO YOU ATTENT MEETINGS OF THE CLUB OR ORGANIZATION YOU BELONG TO?: NEVER
ARE YOU MARRIED, WIDOWED, DIVORCED, SEPARATED, NEVER MARRIED, OR LIVING WITH A PARTNER?: NEVER MARRIED

## 2025-08-18 ASSESSMENT — LIFESTYLE VARIABLES
HOW MANY STANDARD DRINKS CONTAINING ALCOHOL DO YOU HAVE ON A TYPICAL DAY: 1 OR 2
AUDIT-C TOTAL SCORE: 0
HOW OFTEN DO YOU HAVE A DRINK CONTAINING ALCOHOL: NEVER
HOW OFTEN DO YOU HAVE SIX OR MORE DRINKS ON ONE OCCASION: NEVER
SKIP TO QUESTIONS 9-10: 1

## 2025-08-19 ENCOUNTER — OFFICE VISIT (OUTPATIENT)
Dept: MEDICAL GROUP | Facility: PHYSICIAN GROUP | Age: 40
End: 2025-08-19
Payer: COMMERCIAL

## 2025-08-19 VITALS
TEMPERATURE: 97.9 F | SYSTOLIC BLOOD PRESSURE: 118 MMHG | HEIGHT: 61 IN | DIASTOLIC BLOOD PRESSURE: 68 MMHG | WEIGHT: 230.6 LBS | OXYGEN SATURATION: 97 % | HEART RATE: 105 BPM | BODY MASS INDEX: 43.54 KG/M2

## 2025-08-19 DIAGNOSIS — Z91.414 HISTORY OF ABUSE BY INTIMATE PARTNER IN ADULTHOOD: ICD-10-CM

## 2025-08-19 DIAGNOSIS — F41.9 ANXIETY: ICD-10-CM

## 2025-08-19 DIAGNOSIS — Z13.228 SCREENING FOR ENDOCRINE, METABOLIC AND IMMUNITY DISORDER: ICD-10-CM

## 2025-08-19 DIAGNOSIS — Z13.29 SCREENING FOR ENDOCRINE, METABOLIC AND IMMUNITY DISORDER: ICD-10-CM

## 2025-08-19 DIAGNOSIS — Z80.0 FAMILY HISTORY OF STOMACH CANCER: ICD-10-CM

## 2025-08-19 DIAGNOSIS — Z76.89 ENCOUNTER TO ESTABLISH CARE: Primary | ICD-10-CM

## 2025-08-19 DIAGNOSIS — R51.9 CHRONIC NONINTRACTABLE HEADACHE, UNSPECIFIED HEADACHE TYPE: ICD-10-CM

## 2025-08-19 DIAGNOSIS — G89.29 CHRONIC NONINTRACTABLE HEADACHE, UNSPECIFIED HEADACHE TYPE: ICD-10-CM

## 2025-08-19 DIAGNOSIS — Z00.00 PREVENTATIVE HEALTH CARE: ICD-10-CM

## 2025-08-19 DIAGNOSIS — Z11.4 SCREENING FOR HIV (HUMAN IMMUNODEFICIENCY VIRUS): ICD-10-CM

## 2025-08-19 DIAGNOSIS — Z13.0 SCREENING FOR ENDOCRINE, METABOLIC AND IMMUNITY DISORDER: ICD-10-CM

## 2025-08-19 PROBLEM — F32.1 CURRENT MODERATE EPISODE OF MAJOR DEPRESSIVE DISORDER WITHOUT PRIOR EPISODE (HCC): Status: RESOLVED | Noted: 2021-09-09 | Resolved: 2025-08-19

## 2025-08-19 PROBLEM — U09.9 POST-COVID SYNDROME: Status: RESOLVED | Noted: 2021-09-09 | Resolved: 2025-08-19

## 2025-08-19 PROCEDURE — 99214 OFFICE O/P EST MOD 30 MIN: CPT | Performed by: NURSE PRACTITIONER

## 2025-08-19 PROCEDURE — 3074F SYST BP LT 130 MM HG: CPT | Performed by: NURSE PRACTITIONER

## 2025-08-19 PROCEDURE — 3078F DIAST BP <80 MM HG: CPT | Performed by: NURSE PRACTITIONER

## 2025-08-19 RX ORDER — GABAPENTIN 100 MG/1
100 CAPSULE ORAL 3 TIMES DAILY
Qty: 90 CAPSULE | Refills: 3 | Status: SHIPPED | OUTPATIENT
Start: 2025-08-19

## 2025-08-19 ASSESSMENT — PATIENT HEALTH QUESTIONNAIRE - PHQ9
SUM OF ALL RESPONSES TO PHQ9 QUESTIONS 1 AND 2: 1
3. TROUBLE FALLING OR STAYING ASLEEP OR SLEEPING TOO MUCH: SEVERAL DAYS
5. POOR APPETITE OR OVEREATING: NOT AT ALL
9. THOUGHTS THAT YOU WOULD BE BETTER OFF DEAD, OR OF HURTING YOURSELF: NOT AT ALL
SUM OF ALL RESPONSES TO PHQ QUESTIONS 1-9: 6
7. TROUBLE CONCENTRATING ON THINGS, SUCH AS READING THE NEWSPAPER OR WATCHING TELEVISION: MORE THAN HALF THE DAYS
2. FEELING DOWN, DEPRESSED, IRRITABLE, OR HOPELESS: SEVERAL DAYS
1. LITTLE INTEREST OR PLEASURE IN DOING THINGS: NOT AT ALL
8. MOVING OR SPEAKING SO SLOWLY THAT OTHER PEOPLE COULD HAVE NOTICED. OR THE OPPOSITE, BEING SO FIGETY OR RESTLESS THAT YOU HAVE BEEN MOVING AROUND A LOT MORE THAN USUAL: NOT AT ALL
4. FEELING TIRED OR HAVING LITTLE ENERGY: MORE THAN HALF THE DAYS
6. FEELING BAD ABOUT YOURSELF - OR THAT YOU ARE A FAILURE OR HAVE LET YOURSELF OR YOUR FAMILY DOWN: NOT AL ALL

## 2025-08-19 ASSESSMENT — FIBROSIS 4 INDEX: FIB4 SCORE: 0.58

## 2025-08-31 ENCOUNTER — OFFICE VISIT (OUTPATIENT)
Dept: URGENT CARE | Facility: PHYSICIAN GROUP | Age: 40
End: 2025-08-31
Payer: COMMERCIAL

## 2025-08-31 VITALS
BODY MASS INDEX: 43.61 KG/M2 | DIASTOLIC BLOOD PRESSURE: 90 MMHG | RESPIRATION RATE: 14 BRPM | WEIGHT: 231 LBS | SYSTOLIC BLOOD PRESSURE: 120 MMHG | HEIGHT: 61 IN | OXYGEN SATURATION: 98 % | HEART RATE: 89 BPM | TEMPERATURE: 97.3 F

## 2025-08-31 DIAGNOSIS — N30.00 ACUTE CYSTITIS WITHOUT HEMATURIA: ICD-10-CM

## 2025-08-31 DIAGNOSIS — R35.0 URINARY FREQUENCY: ICD-10-CM

## 2025-08-31 DIAGNOSIS — R39.15 URINARY URGENCY: ICD-10-CM

## 2025-08-31 DIAGNOSIS — R30.0 DYSURIA: Primary | ICD-10-CM

## 2025-08-31 DIAGNOSIS — M54.6 ACUTE LEFT-SIDED THORACIC BACK PAIN: ICD-10-CM

## 2025-08-31 DIAGNOSIS — N89.8 VAGINAL IRRITATION: ICD-10-CM

## 2025-08-31 LAB
APPEARANCE UR: NORMAL
BILIRUB UR STRIP-MCNC: NEGATIVE MG/DL
COLOR UR AUTO: YELLOW
GLUCOSE UR STRIP.AUTO-MCNC: NEGATIVE MG/DL
KETONES UR STRIP.AUTO-MCNC: NEGATIVE MG/DL
LEUKOCYTE ESTERASE UR QL STRIP.AUTO: NEGATIVE
NITRITE UR QL STRIP.AUTO: NEGATIVE
PH UR STRIP.AUTO: 6.5 [PH] (ref 5–8)
PROT UR QL STRIP: NEGATIVE MG/DL
RBC UR QL AUTO: NEGATIVE
SP GR UR STRIP.AUTO: 1.01
UROBILINOGEN UR STRIP-MCNC: NEGATIVE MG/DL

## 2025-08-31 RX ORDER — NITROFURANTOIN 25; 75 MG/1; MG/1
100 CAPSULE ORAL EVERY 12 HOURS
Qty: 10 CAPSULE | Refills: 0 | Status: SHIPPED | OUTPATIENT
Start: 2025-08-31 | End: 2025-09-05

## 2025-08-31 ASSESSMENT — FIBROSIS 4 INDEX: FIB4 SCORE: 0.58
